# Patient Record
Sex: FEMALE | Race: BLACK OR AFRICAN AMERICAN | NOT HISPANIC OR LATINO | Employment: PART TIME | URBAN - METROPOLITAN AREA
[De-identification: names, ages, dates, MRNs, and addresses within clinical notes are randomized per-mention and may not be internally consistent; named-entity substitution may affect disease eponyms.]

---

## 2020-07-27 ENCOUNTER — TELEPHONE (OUTPATIENT)
Dept: RHEUMATOLOGY | Facility: CLINIC | Age: 60
End: 2020-07-27

## 2020-07-29 ENCOUNTER — TELEPHONE (OUTPATIENT)
Dept: RHEUMATOLOGY | Facility: CLINIC | Age: 60
End: 2020-07-29

## 2020-07-29 NOTE — TELEPHONE ENCOUNTER
----- Message from Desiree Donahue sent at 7/29/2020  9:35 AM CDT -----  Regarding: returning a call back  Contact: pt  Type:  Patient Returning Call    Who Called:pt  Who Left Message for Patient:nurse  Does the patient know what this is regarding?no  Would the patient rather a call back or a response via [email or web address]? Call back  Best Call Back Number:[phone]  Additional Information: none

## 2020-07-29 NOTE — TELEPHONE ENCOUNTER
----- Message from Indira Araiza sent at 7/29/2020  8:28 AM CDT -----  Contact: Ramya Bui miss a call and would like another call back regards to her referral, Please call her at [phone].    Thanks  Td

## 2020-07-31 PROBLEM — E66.01 MORBID OBESITY: Status: ACTIVE | Noted: 2020-07-31

## 2020-08-06 ENCOUNTER — TELEPHONE (OUTPATIENT)
Dept: ORTHOPEDICS | Facility: CLINIC | Age: 60
End: 2020-08-06

## 2020-08-07 ENCOUNTER — TELEPHONE (OUTPATIENT)
Dept: ORTHOPEDICS | Facility: CLINIC | Age: 60
End: 2020-08-07

## 2020-08-10 ENCOUNTER — TELEPHONE (OUTPATIENT)
Dept: ORTHOPEDICS | Facility: CLINIC | Age: 60
End: 2020-08-10

## 2020-08-10 DIAGNOSIS — G89.29 CHRONIC PAIN OF BOTH KNEES: Primary | ICD-10-CM

## 2020-08-10 DIAGNOSIS — M25.562 CHRONIC PAIN OF BOTH KNEES: Primary | ICD-10-CM

## 2020-08-10 DIAGNOSIS — M25.561 CHRONIC PAIN OF BOTH KNEES: Primary | ICD-10-CM

## 2020-08-17 ENCOUNTER — OFFICE VISIT (OUTPATIENT)
Dept: ORTHOPEDICS | Facility: CLINIC | Age: 60
End: 2020-08-17
Payer: MEDICAID

## 2020-08-17 ENCOUNTER — TELEPHONE (OUTPATIENT)
Dept: ORTHOPEDICS | Facility: CLINIC | Age: 60
End: 2020-08-17

## 2020-08-17 ENCOUNTER — HOSPITAL ENCOUNTER (OUTPATIENT)
Dept: RADIOLOGY | Facility: HOSPITAL | Age: 60
Discharge: HOME | End: 2020-08-17
Attending: PHYSICAL MEDICINE & REHABILITATION
Payer: MEDICAID

## 2020-08-17 VITALS
DIASTOLIC BLOOD PRESSURE: 82 MMHG | SYSTOLIC BLOOD PRESSURE: 123 MMHG | HEIGHT: 61 IN | WEIGHT: 269 LBS | HEART RATE: 90 BPM | BODY MASS INDEX: 50.79 KG/M2

## 2020-08-17 DIAGNOSIS — R26.9 GAIT ABNORMALITY: ICD-10-CM

## 2020-08-17 DIAGNOSIS — M17.11 ARTHRITIS OF RIGHT KNEE: Primary | ICD-10-CM

## 2020-08-17 DIAGNOSIS — M23.41 BODIES, LOOSE, JOINT, KNEE, RIGHT: ICD-10-CM

## 2020-08-17 DIAGNOSIS — G89.29 CHRONIC PAIN OF BOTH KNEES: ICD-10-CM

## 2020-08-17 DIAGNOSIS — M25.562 CHRONIC PAIN OF BOTH KNEES: ICD-10-CM

## 2020-08-17 DIAGNOSIS — R52 PAIN: Primary | ICD-10-CM

## 2020-08-17 DIAGNOSIS — M25.561 CHRONIC PAIN OF BOTH KNEES: ICD-10-CM

## 2020-08-17 DIAGNOSIS — S83.241A TEAR OF MEDIAL MENISCUS OF RIGHT KNEE, CURRENT, UNSPECIFIED TEAR TYPE, INITIAL ENCOUNTER: ICD-10-CM

## 2020-08-17 DIAGNOSIS — M65.331 TRIGGER MIDDLE FINGER OF RIGHT HAND: ICD-10-CM

## 2020-08-17 DIAGNOSIS — M25.561 CHRONIC PAIN OF RIGHT KNEE: ICD-10-CM

## 2020-08-17 DIAGNOSIS — G89.29 CHRONIC PAIN OF RIGHT KNEE: ICD-10-CM

## 2020-08-17 PROCEDURE — 73564 XR KNEE ORTHO BILAT WITH FLEXION: ICD-10-PCS | Mod: 26,50,, | Performed by: RADIOLOGY

## 2020-08-17 PROCEDURE — 99215 OFFICE O/P EST HI 40 MIN: CPT | Mod: PBBFAC,25 | Performed by: PHYSICAL MEDICINE & REHABILITATION

## 2020-08-17 PROCEDURE — 99204 OFFICE O/P NEW MOD 45 MIN: CPT | Mod: S$PBB,,, | Performed by: PHYSICAL MEDICINE & REHABILITATION

## 2020-08-17 PROCEDURE — 99204 PR OFFICE/OUTPT VISIT, NEW, LEVL IV, 45-59 MIN: ICD-10-PCS | Mod: S$PBB,,, | Performed by: PHYSICAL MEDICINE & REHABILITATION

## 2020-08-17 PROCEDURE — 73564 X-RAY EXAM KNEE 4 OR MORE: CPT | Mod: TC,50

## 2020-08-17 PROCEDURE — 73564 X-RAY EXAM KNEE 4 OR MORE: CPT | Mod: 26,50,, | Performed by: RADIOLOGY

## 2020-08-17 PROCEDURE — 99999 PR PBB SHADOW E&M-EST. PATIENT-LVL V: CPT | Mod: PBBFAC,,, | Performed by: PHYSICAL MEDICINE & REHABILITATION

## 2020-08-17 PROCEDURE — 99999 PR PBB SHADOW E&M-EST. PATIENT-LVL V: ICD-10-PCS | Mod: PBBFAC,,, | Performed by: PHYSICAL MEDICINE & REHABILITATION

## 2020-08-17 NOTE — LETTER
August 17, 2020      Maggie Bell MD  [address] [facility] [address]           [facility]  [address] [facility] [address]  Phone: [phone]  Fax: [phone]          Patient: Ramya Swain   MR Number: [id number]   YOB: 1960   Date of Visit: 8/17/2020       Dear Dr. Maggie Bell:    Thank you for referring Ramya Swain to me for evaluation. Attached you will find relevant portions of my assessment and plan of care.    If you have questions, please do not hesitate to call me. I look forward to following Ramya Swain along with you.    Sincerely,    Alisha Atkinson MD    Enclosure  CC:  No Recipients    If you would like to receive this communication electronically, please contact [email or web address] or [phone] to request more information on [employer] access.    For providers and/or their staff who would like to refer a patient to [facility], please contact us through our one-stop-shop provider referral line, [facility], at [phone].    If you feel you have received this communication in error or would no longer like to receive these types of communications, please e-mail [email or web address]

## 2020-08-17 NOTE — PATIENT INSTRUCTIONS
Recommend trying Voltaren Gel over the counter, and following the directions on the package.   Remember that you may need to use it consistently for several days before seeing results.        What is Trigger Finger?  Trigger finger is an inflammation of tissue inside your finger or thumb. It is also called tenosynovitis (ten-oh-sin-oh-VY-tis). Tendons (cordlike fibers that attach muscle to bone and allow you to bend the joints) become swollen. So does the synovium (a slick membrane that allows the tendons to move easily). This makes it difficult to straighten the finger or thumb.    Causes  Repeated use of a tool with strong gripping, such as a drill or wrench, can irritate and inflame the tendons and the synovium. It is also more common in certain medical conditions such as rheumatoid arthritis, gout, and diabetes. But often the cause of trigger finger is unknown.  Inside your finger  Tendons connect muscles in your forearm to the bones in your fingers. The tendons in each finger are surrounded by a protective tendon sheath. This sheath is lined with synovium, which produces a fluid that allows the tendons to slide easily when you bend and straighten the finger. If a tendon is irritated, it becomes inflamed.  When a tendon is inflamed  When a tendon is inflamed, it causes the lining of the tendon sheath to swell and thicken. Or the tendon itself may thicken. Then the sheath pinches the tendon, and the tendon can no longer slide easily inside the sheath. When you straighten your finger, the tendon sticks or locks as it tries to squeeze back through the sheath.    Symptoms  The first sign of trigger finger may be pain where the finger or thumb joins the palm. You may also notice some swelling. As the tendon becomes more inflamed, the finger may start to catch when you try to straighten it. When the locked tendon releases, the finger jumps, as if you were releasing the trigger of a gun. This further irritates the  tendon, and may set up a cycle of catching and swelling.   Date Last Reviewed: 9/28/2015  © 1885-1330 [employer]. [address] [place] [address]. All rights reserved. This information is not intended as a substitute for professional medical care. Always follow your healthcare professional's instructions.        Ankle Arthroscopy: Conditions Treated  Arthroscopy is used to find and treat many types of ankle problems. These include loose bodies, bone spurs, osteochondritis dissecans (OCD), and synovitis.    Loose bodies  Loose bodies are bone or cartilage fragments that have chipped off inside the joint. If left in place, they can damage the joint surface and restrict ankle movement. Your surgeon can remove loose bodies from the joint. This will help restore normal, smooth ankle motion.    Bone spurs  When the bones in a joint pinch each other, they are impinged. This problem is often caused by bone spurs (growths) that have formed on the joint. Pressure from the spur may cause pain when you move your ankle. Your surgeon will remove the spur and smooth the bone surface.    OCD  Because of an injury to its blood supply, a piece of bone can become loose inside the joint. It can occur from trauma, or without any obvious cause. Called OCD (osteochondritis dissecans), this problem can cause pain and swelling. The surgeon can remove the bone or secure it in place. Open surgery may also be needed. If the bone piece is removed, your surgeon may try to stimulate healing by drilling or by putting holes into the remaining exposed bone.   Synovitis  If the lining of the joint (synovium) is pinched, it may become inflamed. This can cause pain and swelling in the ankle. The surgeon can remove the pinched synovium and relieve symptoms.  Date Last Reviewed: 8/31/2015  © 9718-9872 [employer]. [address] [place] [address]. All rights reserved. This information is not intended as a  substitute for professional medical care. Always follow your healthcare professional's instructions.

## 2020-08-17 NOTE — PROGRESS NOTES
SPORTS MEDICINE / PM&R NEW PATIENT H & P:    Referring Physician: Maggie Bell MD    Chief Complaint   Patient presents with    Right Knee - Pain, Swelling       HPI: This is a 59 y.o.  female being seen in clinic today for evaluation of Pain and Swelling of the Right Knee   The problem first began several years ago but worsened within the last month. She feels aching, sore, constant, pain at rest and pain with movement pain around her right knee. The symptoms are worsening. She has tried ice, heat, injection, ointment without improvement. She has tried physical therapy without much improvement. Knee steroid injection 3 weeks ago by PCP provided slight relief. PCP wanted to refer her to rheumatology, but apparently they aren't taking medicaid. She also brings up complaints of right long finger trigger finger, and left thigh pain with numbness and tingling that radiates to lower leg. History of back pain as well and has been told she has pinched nerves in her back. Apparently had x-rays several years ago at outside facility.     History obtained from patient.    Past family, medical, social, surgical history, and vital signs reviewed in chart.    Review of Systems   Constitutional: Negative for chills, fever and weight loss.   HENT: Negative for hearing loss and sore throat.    Eyes: Negative for blurred vision, photophobia and pain.   Respiratory: Negative for shortness of breath.    Cardiovascular: Negative for chest pain.   Gastrointestinal: Negative for abdominal pain.   Genitourinary: Negative for dysuria.   Skin: Negative for rash.   Neurological: Negative for tingling and headaches.   Endo/Heme/Allergies: Does not bruise/bleed easily.   Psychiatric/Behavioral: Negative for depression.       General    Nursing note and vitals reviewed.  Constitutional: She is oriented to person, place, and time. She appears well-developed and well-nourished.   obese   HENT:   Head: Normocephalic and atraumatic.   Eyes:  Conjunctivae and EOM are normal. Pupils are equal, round, and reactive to light.   Neck: Neck supple.   Cardiovascular: Intact distal pulses.    Pulmonary/Chest: Effort normal. No respiratory distress.   Abdominal: She exhibits no distension.   Neurological: She is alert and oriented to person, place, and time. She has normal reflexes.   Psychiatric: She has a normal mood and affect.     General Musculoskeletal Exam   Gait: antalgic       Right Knee Exam     Inspection   Erythema: absent  Scars: absent  Swelling: absent  Effusion: absent  Deformity: absent  Bruising: absent    Tenderness   The patient is tender to palpation of the medial joint line, lateral joint line, condyle and patella.    Crepitus   The patient has crepitus of the patella.    Range of Motion   Extension: normal   Flexion:  120 abnormal     Tests   Meniscus   Keren:  Medial - positive Lateral - negative  Ligament Examination Lachman: normal (-1 to 2mm) PCL-Posterior Drawer: normal (0 to 2mm)     MCL - Valgus: normal (0 to 2mm)  LCL - Varus: normal  Patella   Patellar apprehension: negative  Patellar Tracking: normal  Patellar Grind: positive    Other   Popliteal (Baker's) Cyst: present (possible small cyst, but difficult to palpate well given body habitus)  Sensation: normal    Comments:  Significant crepitus    Left Knee Exam     Inspection   Erythema: absent  Swelling: absent  Effusion: absent  Deformity: absent  Bruising: absent    Tenderness   The patient tender to palpation of the medial retinaculum and lateral joint line.    Range of Motion   The patient has normal left knee ROM.    Tests   Meniscus   Keren:  Medial - negative Lateral - negative  Stability Lachman: normal (-1 to 2mm) PCL-Posterior Drawer: normal (0 to 2mm)  MCL - Valgus: normal (0 to 2mm)  LCL - Varus: normal (0 to 2mm)  Patella   Patellar apprehension: negative  Patellar Tracking: normal    Other   Sensation: normal    Right Hip Exam   Right hip exam is normal.      Tests   Pain w/ forced internal rotation (SHIRA): absent  Left Hip Exam   Left hip exam is normal.    Tests   Pain w/ forced internal rotation (SHIRA): absent          Muscle Strength   Right Lower Extremity   Hip Abduction: 4/5   Hip Adduction: 5/5   Hip Flexion: 5/5   Quadriceps:  5/5   Hamstrin/5   Left Lower Extremity   Hip Abduction: 4/5   Hip Adduction: 5/5   Hip Flexion: 5/5   Quadriceps:  5/5   Hamstrin/5     Reflexes     Left Side  Quadriceps:  2+  Achilles:  2+    Right Side   Quadriceps:  2+  Achilles:  2+    Vascular Exam     Right Pulses  Dorsalis Pedis:      2+          Left Pulses  Dorsalis Pedis:      2+                Narrative & Impression     EXAMINATION:  XR KNEE ORTHO BILAT WITH FLEXION     CLINICAL HISTORY:  Pain in right knee     TECHNIQUE:  AP standing of both knees, PA flexion standing views of both knees, and Merchant views of both knees were performed.  Lateral views of both knees were also performed.     COMPARISON:  None     FINDINGS:  Right knee: No acute fractures or dislocations visualized.  There is mild-to-moderate tricompartmental osteoarthritis which appears most severe in the patellofemoral and medial compartments.  Several rounded periarticular ossific densities are present, concerning for intra-articular bodies, most notably posterior to the lateral femoral condyle and within the lateral aspect of the suprapatellar recess.     Left knee:  There is no radiographic evidence of acute osseous, articular, or soft tissue abnormality. There is mild-to-moderate tricompartmental osteoarthritis, most severe in the medial compartment.     Impression:     Degenerative findings as above        Electronically signed by: Abe Farley MD  Date:                                            2020  Time:                                           10:05         IMPRESSION/PLAN: Ramya is a 59 y.o.  female with:    1. Arthritis of right knee  - Ambulatory referral/consult to  Orthopedics    2. Bodies, loose, joint, knee, right    3. Gait abnormality    4. Trigger middle finger of right hand  - Ambulatory referral/consult to Orthopedics; Future    5. Tear of medial meniscus of right knee, current, unspecified tear type, initial encounter    6. Chronic pain of right knee  - MRI Knee Without Contrast Right; Future       The findings were discussed with Ramya in detail. Given likely intrinsic joint pathology and possible intra-articular loose bodies, we'll get MRI for further evaluation. May need knee scope to remove loose bodies. Left leg pain sounds radicular in nature. Will get lumbar x-rays at her follow-up visit. For the right trigger finger, I'll refer to Dr. Guerra for further evaluation and management. There isn't much else for me to do for her knee as she's failed PT, oral meds, and recent steroid injection. She can try voltaren gel and we'll await the x-ray results. All of her questions were answered. She was provided this plan in writing. She will follow-up with me 1 week after MRI.     Alisha Atkinson M.D.  Sports Medicine

## 2020-08-18 ENCOUNTER — TELEPHONE (OUTPATIENT)
Dept: ORTHOPEDICS | Facility: CLINIC | Age: 60
End: 2020-08-18

## 2020-08-18 NOTE — TELEPHONE ENCOUNTER
Returned call. Patient wants to know how much of body would be in MRI tube. Told her I'm not sure, but likely most of it. Recommend she take her xanax 30 minutes prior to appointment and bring someone to drive her. She voiced her understanding.    ----- Message from Preethi Leonard MA sent at 8/18/2020 11:24 AM CDT -----  Regarding: FW: medication  Are you okay with sending medication for a MRI?  Thank you!  ----- Message -----  From: Kristen Cummings  Sent: 8/18/2020  10:59 AM CDT  To: [facility] Brito Staff  Subject: medication                                       Good morning,        Pt would like a call back in regards to medication for MRI and can be reached at [phone].      [id number] [place] - [address]  [address]  [place] [address]  Phone: [phone] Fax: [phone]      Thanks,  Kristen Cummings

## 2020-08-28 ENCOUNTER — TELEPHONE (OUTPATIENT)
Dept: ORTHOPEDICS | Facility: CLINIC | Age: 60
End: 2020-08-28

## 2020-08-28 ENCOUNTER — HOSPITAL ENCOUNTER (OUTPATIENT)
Dept: RADIOLOGY | Facility: HOSPITAL | Age: 60
Discharge: HOME | End: 2020-08-28
Attending: PHYSICAL MEDICINE & REHABILITATION
Payer: MEDICAID

## 2020-08-28 DIAGNOSIS — G89.29 CHRONIC PAIN OF RIGHT KNEE: ICD-10-CM

## 2020-08-28 DIAGNOSIS — M25.561 CHRONIC PAIN OF RIGHT KNEE: ICD-10-CM

## 2020-08-28 DIAGNOSIS — M17.11 ARTHRITIS OF RIGHT KNEE: Primary | ICD-10-CM

## 2020-08-28 DIAGNOSIS — M23.41 BODIES, LOOSE, JOINT, KNEE, RIGHT: ICD-10-CM

## 2020-08-28 PROCEDURE — 73721 MRI JNT OF LWR EXTRE W/O DYE: CPT | Mod: TC,RT

## 2020-08-28 PROCEDURE — 73721 MRI JNT OF LWR EXTRE W/O DYE: CPT | Mod: 26,RT,, | Performed by: RADIOLOGY

## 2020-08-28 PROCEDURE — 73721 MRI KNEE WITHOUT CONTRAST RIGHT: ICD-10-PCS | Mod: 26,RT,, | Performed by: RADIOLOGY

## 2020-09-02 ENCOUNTER — OFFICE VISIT (OUTPATIENT)
Dept: ORTHOPEDICS | Facility: CLINIC | Age: 60
End: 2020-09-02
Payer: MEDICAID

## 2020-09-02 VITALS
WEIGHT: 269 LBS | BODY MASS INDEX: 50.79 KG/M2 | DIASTOLIC BLOOD PRESSURE: 71 MMHG | HEART RATE: 103 BPM | HEIGHT: 61 IN | SYSTOLIC BLOOD PRESSURE: 121 MMHG

## 2020-09-02 DIAGNOSIS — M65.331 TRIGGER MIDDLE FINGER OF RIGHT HAND: Primary | ICD-10-CM

## 2020-09-02 PROCEDURE — 99213 PR OFFICE/OUTPT VISIT, EST, LEVL III, 20-29 MIN: ICD-10-PCS | Mod: S$PBB,25,, | Performed by: ORTHOPAEDIC SURGERY

## 2020-09-02 PROCEDURE — 20550 NJX 1 TENDON SHEATH/LIGAMENT: CPT | Mod: PBBFAC | Performed by: ORTHOPAEDIC SURGERY

## 2020-09-02 PROCEDURE — 99214 OFFICE O/P EST MOD 30 MIN: CPT | Mod: PBBFAC | Performed by: ORTHOPAEDIC SURGERY

## 2020-09-02 PROCEDURE — 99999 PR PBB SHADOW E&M-EST. PATIENT-LVL IV: CPT | Mod: PBBFAC,,, | Performed by: ORTHOPAEDIC SURGERY

## 2020-09-02 PROCEDURE — 20550 TENDON SHEATH: R LONG MCP: ICD-10-PCS | Mod: S$PBB,F7,, | Performed by: ORTHOPAEDIC SURGERY

## 2020-09-02 PROCEDURE — 99999 PR PBB SHADOW E&M-EST. PATIENT-LVL IV: ICD-10-PCS | Mod: PBBFAC,,, | Performed by: ORTHOPAEDIC SURGERY

## 2020-09-02 PROCEDURE — 99213 OFFICE O/P EST LOW 20 MIN: CPT | Mod: S$PBB,25,, | Performed by: ORTHOPAEDIC SURGERY

## 2020-09-02 RX ORDER — TRIAMCINOLONE ACETONIDE 40 MG/ML
40 INJECTION, SUSPENSION INTRA-ARTICULAR; INTRAMUSCULAR
Status: DISCONTINUED | OUTPATIENT
Start: 2020-09-02 | End: 2020-09-02 | Stop reason: HOSPADM

## 2020-09-02 RX ADMIN — TRIAMCINOLONE ACETONIDE 40 MG: 200 INJECTION, SUSPENSION INTRA-ARTICULAR; INTRAMUSCULAR at 02:09

## 2020-09-02 NOTE — LETTER
September 2, 2020      Alisha Atkinson MD  [address]  [place] [address]           [facility]  [address] [place] [address]  Phone: [phone]  Fax: [phone]          Patient: Ramya Swain   MR Number: [id number]   YOB: 1960   Date of Visit: 9/2/2020       Dear Dr. Alisha Atkinson:    Thank you for referring Ramya Swain to me for evaluation. Attached you will find relevant portions of my assessment and plan of care.    If you have questions, please do not hesitate to call me. I look forward to following Ramya Swain along with you.    Sincerely,    Moustapha Guerra MD    Enclosure  CC:  No Recipients    If you would like to receive this communication electronically, please contact [email or web address] or [phone] to request more information on [employer] access.    For providers and/or their staff who would like to refer a patient to [facility], please contact us through our one-stop-shop provider referral line, [facility], at [phone].    If you feel you have received this communication in error or would no longer like to receive these types of communications, please e-mail [email or web address]

## 2020-09-02 NOTE — PROCEDURES
Tendon Sheath: R long MCP    Date/Time: 9/2/2020 2:20 PM  Performed by: Moustapha Guerra MD  Authorized by: Moustapha Guerra MD     Consent Done?:  Yes (Verbal)  Indications:  Pain  Timeout: prior to procedure the correct patient, procedure, and site was verified    Prep: patient was prepped and draped in usual sterile fashion      Local anesthesia used?: Yes    Local anesthetic:  Lidocaine 2% without epinephrine  Anesthetic total (ml):  0.5    Location:  Long finger  Site:  R long MCP  Needle size:  25 G  Approach:  Volar  Medications:  40 mg triamcinolone acetonide 40 mg/mL

## 2020-09-02 NOTE — PROGRESS NOTES
Subjective:     Patient ID: Ramya Swain is a 59 y.o. female.    Chief Complaint: Pain of the Right Hand    The patient is a 59-year-old female with a right long trigger finger for the last 6 years.  She has not had injection before.  She wishes to try cortisone injection today      Past Medical History:   Diagnosis Date    Anxiety     Arthritis     CKD (chronic kidney disease), stage III     Diabetes mellitus, type 2     GERD (gastroesophageal reflux disease)     Hyperlipidemia     Hypertension      Past Surgical History:   Procedure Laterality Date    BILATERAL OOPHORECTOMY      HYSTERECTOMY      NEPHRECTOMY      TUBAL LIGATION       Family History   Problem Relation Age of Onset    Hypertension Mother     Diabetes Mother     Stroke Father      Social History     Socioeconomic History    Marital status: Single     Spouse name: Not on file    Number of children: Not on file    Years of education: Not on file    Highest education level: Not on file   Occupational History    Not on file   Social Needs    Financial resource strain: Not on file    Food insecurity     Worry: Not on file     Inability: Not on file    Transportation needs     Medical: Not on file     Non-medical: Not on file   Tobacco Use    Smoking status: Never Smoker    Smokeless tobacco: Never Used   Substance and Sexual Activity    Alcohol use: Not on file    Drug use: Not on file    Sexual activity: Not on file   Lifestyle    Physical activity     Days per week: Not on file     Minutes per session: Not on file    Stress: Not on file   Relationships    Social connections     Talks on phone: Not on file     Gets together: Not on file     Attends Religion service: Not on file     Active member of club or organization: Not on file     Attends meetings of clubs or organizations: Not on file     Relationship status: Not on file   Other Topics Concern    Not on file   Social History Narrative    Not on file     Medication  "List with Changes/Refills   Current Medications    ACCU-CHEK LUIS PLUS TEST STRP STRP    Test blood glucose BID    ACYCLOVIR (ZOVIRAX) 800 MG TAB    Take 1 tablet (800 mg total) by mouth once daily.    ALBUTEROL (PROVENTIL/VENTOLIN HFA) 90 MCG/ACTUATION INHALER    Inhale 2 puffs into the lungs every 6 (six) hours as needed for Wheezing.    ALPRAZOLAM (XANAX) 1 MG TABLET    TAKE 1 TABLET BY MOUTH EVERY DAY AS NEEDED    AMLODIPINE (NORVASC) 10 MG TABLET    TAKE 1 TABLET BY MOUTH EVERY DAY    ATORVASTATIN (LIPITOR) 40 MG TABLET    Take 1 tablet (40 mg total) by mouth once daily.    BD ULTRA-FINE MINI PEN NEEDLE 31 GAUGE X 3/16" NDLE    USE DAILY WITH VICTOZA INJECTIONS    BLOOD-GLUCOSE METER (ACCU-CHEK LUIS PLUS METER) MISC    Use with test strips for home glucose monitoring daily    CARVEDILOL (COREG) 25 MG TABLET    TAKE 1 TABLET BY MOUTH TWICE A DAY    CLONIDINE (CATAPRES) 0.2 MG TABLET    Take 1 tablet (0.2 mg total) by mouth 2 (two) times daily.    CYCLOBENZAPRINE (FLEXERIL) 10 MG TABLET    TAKE 1 TABLET BY MOUTH THREE TIMES A DAY AS NEEDED FOR MUSCLE SPASMS    FLUTICASONE PROPIONATE (FLONASE) 50 MCG/ACTUATION NASAL SPRAY    2 sprays by Each Nostril route once daily.    FUROSEMIDE (LASIX) 40 MG TABLET    Take 40 mg by mouth as needed.    GABAPENTIN (NEURONTIN) 300 MG CAPSULE    Take 1 capsule (300 mg total) by mouth 3 (three) times daily.    HYDROCHLOROTHIAZIDE (HYDRODIURIL) 25 MG TABLET    Take 1 tablet (25 mg total) by mouth once daily.    HYDROCODONE-ACETAMINOPHEN (NORCO) 5-325 MG PER TABLET    Take 1 tablet by mouth every 6 (six) hours as needed for Pain.    KETOROLAC (TORADOL) 10 MG TABLET    Take 10 mg by mouth every 8 (eight) hours as needed for Pain.    LANCING DEVICE WITH LANCETS (ACCU-CHEK FASTCLIX LANCING DEV) KIT    Test blood glucose BID    LIRAGLUTIDE 0.6 MG/0.1 ML, 18 MG/3 ML, SUBQ PNIJ (VICTOZA 2-JASPER) 0.6 MG/0.1 ML (18 MG/3 ML) PNIJ    INJECT 1.2MG UNDER SKIN DAILY    LOSARTAN (COZAAR) 100 MG " TABLET    Take 1 tablet (100 mg total) by mouth daily as needed.    METFORMIN (GLUCOPHAGE-XR) 500 MG XR 24HR TABLET    Take 1 tablet (500 mg total) by mouth 2 (two) times daily.    TERAZOSIN (HYTRIN) 2 MG CAPSULE    Take 1 capsule (2 mg total) by mouth every evening.     Review of patient's allergies indicates:  No Known Allergies  Review of Systems   Constitution: Negative for malaise/fatigue.   HENT: Negative for hearing loss.    Eyes: Negative for double vision and visual disturbance.   Cardiovascular: Negative for chest pain.   Respiratory: Negative for shortness of breath.    Endocrine: Negative for cold intolerance.   Hematologic/Lymphatic: Does not bruise/bleed easily.   Skin: Negative for poor wound healing and suspicious lesions.   Musculoskeletal: Negative for gout, joint pain and joint swelling.   Gastrointestinal: Positive for heartburn. Negative for nausea and vomiting.   Genitourinary: Positive for dysuria.   Neurological: Negative for numbness, paresthesias and sensory change.   Psychiatric/Behavioral: Negative for depression, memory loss and substance abuse. The patient is nervous/anxious.    Allergic/Immunologic: Negative for persistent infections.       Objective:   Body mass index is 50.83 kg/m².  Vitals:    09/02/20 1422   BP: 121/71   Pulse: 103                General    Constitutional: She is oriented to person, place, and time. She appears well-developed and well-nourished. No distress.   HENT:   Head: Normocephalic.   Eyes: EOM are normal.   Neck: Normal range of motion.   Pulmonary/Chest: Effort normal.   Neurological: She is oriented to person, place, and time.   Psychiatric: She has a normal mood and affect.             Right Hand/Wrist Exam     Inspection   Scars: Wrist - absent Hand -  absent  Effusion: Wrist - absent Hand -  absent    Pain   Hand - The patient exhibits pain of the middle MCP.    Other     Neuorologic Exam    Median Distribution: normal  Ulnar Distribution:  normal  Radial Distribution: normal    Comments:  The patient is active triggering right long finger with a palpable nodule that moves with tendon excursion.          Vascular Exam       Capillary Refill  Right Hand: normal capillary refill      radiographs were not obtained today  Assessment:     Encounter Diagnosis   Name Primary?    Trigger middle finger of right hand         Plan:     The patient injected in the right long trigger finger with 0.5 cc of Kenalog and 0.5 cc of 2% plain lidocaine under sterile technique.  She will return in 3 weeks if not improved                Disclaimer: This note was prepared using a voice recognition system and is likely to have sound alike errors within the text.

## 2020-09-25 ENCOUNTER — HOSPITAL ENCOUNTER (OUTPATIENT)
Dept: CARDIOLOGY | Facility: HOSPITAL | Age: 60
Discharge: HOME | End: 2020-09-25
Attending: ORTHOPAEDIC SURGERY
Payer: MEDICAID

## 2020-09-25 ENCOUNTER — OFFICE VISIT (OUTPATIENT)
Dept: ORTHOPEDICS | Facility: CLINIC | Age: 60
End: 2020-09-25
Payer: MEDICAID

## 2020-09-25 ENCOUNTER — HOSPITAL ENCOUNTER (OUTPATIENT)
Dept: RADIOLOGY | Facility: HOSPITAL | Age: 60
Discharge: HOME | End: 2020-09-25
Attending: ORTHOPAEDIC SURGERY
Payer: MEDICAID

## 2020-09-25 VITALS
BODY MASS INDEX: 50.79 KG/M2 | DIASTOLIC BLOOD PRESSURE: 78 MMHG | SYSTOLIC BLOOD PRESSURE: 122 MMHG | HEART RATE: 91 BPM | WEIGHT: 269 LBS | HEIGHT: 61 IN

## 2020-09-25 DIAGNOSIS — Z01.818 PREOP TESTING: ICD-10-CM

## 2020-09-25 DIAGNOSIS — S83.241A ACUTE MEDIAL MENISCUS TEAR OF RIGHT KNEE, INITIAL ENCOUNTER: Primary | ICD-10-CM

## 2020-09-25 DIAGNOSIS — M17.11 ARTHRITIS OF RIGHT KNEE: ICD-10-CM

## 2020-09-25 DIAGNOSIS — M70.62 GREATER TROCHANTERIC BURSITIS OF LEFT HIP: ICD-10-CM

## 2020-09-25 DIAGNOSIS — G89.29 CHRONIC PAIN OF RIGHT KNEE: ICD-10-CM

## 2020-09-25 DIAGNOSIS — S83.241A ACUTE MEDIAL MENISCUS TEAR OF RIGHT KNEE, INITIAL ENCOUNTER: ICD-10-CM

## 2020-09-25 DIAGNOSIS — M23.41 LOOSE BODY OF RIGHT KNEE: ICD-10-CM

## 2020-09-25 DIAGNOSIS — M94.261 CHONDROMALACIA OF RIGHT KNEE: Primary | ICD-10-CM

## 2020-09-25 DIAGNOSIS — M25.561 CHRONIC PAIN OF RIGHT KNEE: ICD-10-CM

## 2020-09-25 DIAGNOSIS — Z01.818 PREOP TESTING: Primary | ICD-10-CM

## 2020-09-25 DIAGNOSIS — M25.461 EFFUSION OF RIGHT KNEE: ICD-10-CM

## 2020-09-25 PROCEDURE — 93005 ELECTROCARDIOGRAM TRACING: CPT

## 2020-09-25 PROCEDURE — 71046 X-RAY EXAM CHEST 2 VIEWS: CPT | Mod: TC

## 2020-09-25 PROCEDURE — 99215 OFFICE O/P EST HI 40 MIN: CPT | Mod: PBBFAC,25 | Performed by: ORTHOPAEDIC SURGERY

## 2020-09-25 PROCEDURE — 93010 ELECTROCARDIOGRAM REPORT: CPT | Mod: ,,, | Performed by: INTERNAL MEDICINE

## 2020-09-25 PROCEDURE — 99214 OFFICE O/P EST MOD 30 MIN: CPT | Mod: S$PBB,,, | Performed by: ORTHOPAEDIC SURGERY

## 2020-09-25 PROCEDURE — 71046 X-RAY EXAM CHEST 2 VIEWS: CPT | Mod: 26,,, | Performed by: RADIOLOGY

## 2020-09-25 PROCEDURE — 99999 PR PBB SHADOW E&M-EST. PATIENT-LVL V: CPT | Mod: PBBFAC,,, | Performed by: ORTHOPAEDIC SURGERY

## 2020-09-25 PROCEDURE — 99999 PR PBB SHADOW E&M-EST. PATIENT-LVL V: ICD-10-PCS | Mod: PBBFAC,,, | Performed by: ORTHOPAEDIC SURGERY

## 2020-09-25 PROCEDURE — 71046 XR CHEST PA AND LATERAL PRE-OP: ICD-10-PCS | Mod: 26,,, | Performed by: RADIOLOGY

## 2020-09-25 PROCEDURE — 93010 EKG 12-LEAD: ICD-10-PCS | Mod: ,,, | Performed by: INTERNAL MEDICINE

## 2020-09-25 PROCEDURE — 99214 PR OFFICE/OUTPT VISIT, EST, LEVL IV, 30-39 MIN: ICD-10-PCS | Mod: S$PBB,,, | Performed by: ORTHOPAEDIC SURGERY

## 2020-09-25 NOTE — H&P (VIEW-ONLY)
Subjective:     Patient ID: Ramya Swain is a 60 y.o. female.    Chief Complaint: Pain of the Right Knee   09/25/2020   left hip pain  HPI:  60-year-old been having pain for 2-3 years now.  Cannot remember any specific trauma.  She occasionally does have back pain.  Treatment included physical therapy at [facility].  Cannot take NSAIDs due to having 1 kidney and being diabetic.  Claims cannot sleep on the left side a bothers her.  She does have occasional low back pain.  She does take Tylenol but does not help.  Her workup included x-rays and MRI of her right knee.  Pain is 7/10.  She claims she has catching locking feeling unable to kneel unable to do stairs as well.  She feels occasionally about to fall catches herself because of the right knee.  Pain is episodic.  She is ambulating without any assistive devices.  Denies any fever or chills or shortness of breath or difficulty with chewing or swallowing loss of bowel bladder control or blurry vision double vision or loss of sense of smell or taste  Past Medical History:   Diagnosis Date    Anxiety     Arthritis     CKD (chronic kidney disease), stage III     Diabetes mellitus, type 2     GERD (gastroesophageal reflux disease)     Hyperlipidemia     Hypertension      Past Surgical History:   Procedure Laterality Date    BILATERAL OOPHORECTOMY      HYSTERECTOMY      NEPHRECTOMY      TUBAL LIGATION       Family History   Problem Relation Age of Onset    Hypertension Mother     Diabetes Mother     Stroke Father      Social History     Socioeconomic History    Marital status: Single     Spouse name: Not on file    Number of children: Not on file    Years of education: Not on file    Highest education level: Not on file   Occupational History    Not on file   Social Needs    Financial resource strain: Not on file    Food insecurity     Worry: Not on file     Inability: Not on file    Transportation needs     Medical: Not on file      "Non-medical: Not on file   Tobacco Use    Smoking status: Never Smoker    Smokeless tobacco: Never Used   Substance and Sexual Activity    Alcohol use: Not on file    Drug use: Not on file    Sexual activity: Not on file   Lifestyle    Physical activity     Days per week: Not on file     Minutes per session: Not on file    Stress: Not on file   Relationships    Social connections     Talks on phone: Not on file     Gets together: Not on file     Attends Jain service: Not on file     Active member of club or organization: Not on file     Attends meetings of clubs or organizations: Not on file     Relationship status: Not on file   Other Topics Concern    Not on file   Social History Narrative    Not on file     Medication List with Changes/Refills   Current Medications    ACCU-CHEK LUIS PLUS TEST STRP STRP    Test blood glucose BID    ACYCLOVIR (ZOVIRAX) 800 MG TAB    Take 1 tablet (800 mg total) by mouth once daily.    ALBUTEROL (PROVENTIL/VENTOLIN HFA) 90 MCG/ACTUATION INHALER    Inhale 2 puffs into the lungs every 6 (six) hours as needed for Wheezing.    ALPRAZOLAM (XANAX) 1 MG TABLET    TAKE 1 TABLET BY MOUTH EVERY DAY AS NEEDED    AMLODIPINE (NORVASC) 10 MG TABLET    TAKE 1 TABLET BY MOUTH EVERY DAY    ATORVASTATIN (LIPITOR) 40 MG TABLET    Take 1 tablet (40 mg total) by mouth once daily.    BD ULTRA-FINE MINI PEN NEEDLE 31 GAUGE X 3/16" NDLE    USE DAILY WITH VICTOZA INJECTIONS    BLOOD-GLUCOSE METER (ACCU-CHEK LUIS PLUS METER) MISC    Use with test strips for home glucose monitoring daily    CARVEDILOL (COREG) 25 MG TABLET    TAKE 1 TABLET BY MOUTH TWICE A DAY    CLONIDINE (CATAPRES) 0.2 MG TABLET    Take 1 tablet (0.2 mg total) by mouth 2 (two) times daily.    CYCLOBENZAPRINE (FLEXERIL) 10 MG TABLET    TAKE 1 TABLET BY MOUTH THREE TIMES A DAY AS NEEDED FOR MUSCLE SPASMS    FLUTICASONE PROPIONATE (FLONASE) 50 MCG/ACTUATION NASAL SPRAY    2 sprays by Each Nostril route once daily.    FUROSEMIDE " (LASIX) 40 MG TABLET    Take 40 mg by mouth as needed.    GABAPENTIN (NEURONTIN) 300 MG CAPSULE    Take 1 capsule (300 mg total) by mouth 3 (three) times daily.    HYDROCHLOROTHIAZIDE (HYDRODIURIL) 25 MG TABLET    Take 1 tablet (25 mg total) by mouth once daily.    HYDROCODONE-ACETAMINOPHEN (NORCO) 5-325 MG PER TABLET    Take 1 tablet by mouth every 6 (six) hours as needed for Pain.    KETOROLAC (TORADOL) 10 MG TABLET    Take 10 mg by mouth every 8 (eight) hours as needed for Pain.    LANCING DEVICE WITH LANCETS (ACCU-CHEK FASTCLIX LANCING DEV) KIT    Test blood glucose BID    LIRAGLUTIDE 0.6 MG/0.1 ML, 18 MG/3 ML, SUBQ PNIJ (VICTOZA 2-JASPER) 0.6 MG/0.1 ML (18 MG/3 ML) PNIJ    INJECT 1.2MG UNDER SKIN DAILY    LOSARTAN (COZAAR) 100 MG TABLET    Take 1 tablet (100 mg total) by mouth daily as needed.    METFORMIN (GLUCOPHAGE-XR) 500 MG ER 24HR TABLET    TAKE 1 TABLET BY MOUTH TWICE A DAY    TERAZOSIN (HYTRIN) 2 MG CAPSULE    Take 1 capsule (2 mg total) by mouth every evening.     Review of patient's allergies indicates:  No Known Allergies  Review of Systems   Constitution: Negative for decreased appetite.   HENT: Negative for tinnitus.    Eyes: Negative for double vision.   Cardiovascular: Negative for chest pain.   Respiratory: Negative for wheezing.    Hematologic/Lymphatic: Negative for bleeding problem.   Skin: Negative for dry skin.   Musculoskeletal: Positive for back pain, joint pain and stiffness. Negative for arthritis, gout and neck pain.   Gastrointestinal: Negative for abdominal pain.   Genitourinary: Negative for bladder incontinence.   Neurological: Negative for numbness, paresthesias and sensory change.   Psychiatric/Behavioral: Negative for altered mental status.       Objective:   Body mass index is 50.83 kg/m².  Vitals:    09/25/20 0906   BP: 122/78   Pulse: 91          General    Constitutional: She is oriented to person, place, and time. She appears well-developed.   HENT:   Head: Atraumatic.   Eyes:  EOM are normal.   Cardiovascular: Normal rate.    Pulmonary/Chest: Effort normal.   Neurological: She is alert and oriented to person, place, and time.   Psychiatric: Judgment normal.            Cervical rotation is very functional  Bilateral upper extremity neurovascularly intact.  Negative Tinel.  Strength is 5/5.  Radial and ulnar pulses 2+.  Lumbar with slight discomfort around L4-L5 paraspinal but no true deformity seen.  Negative straight leg raising bilaterally  Left hip with severe pain to palpation over the greater trochanter.  Passive internal external rotation of both hips without pain in the groin.  Hip flexors, abductors, adductors, quads, hamstrings, ankle extensors and flexors, inverters and everters all 5/5  Right knee with medial joint pain there is also crepitus and discomfort in the feeling of her loose body in the superior lateral pouch to palpation.  Range of motion 0-100 degrees.  Collaterals are stable as well as cruciates.  Mild to moderate swelling.  Positive Keren sign.  Left knee with very mild discomfort medially.  Mild crepitus.  Full range of motion.  Collaterals or cruciates are stable.  No swelling.  Calves are soft nontender with mild swelling  Multiple varicosities  Ankle motion intact  DP 1+ and PT 1+  Skin is warm to touch no obvious lesions    Relevant imaging results reviewed and interpreted by me, discussed with the patient and / or family today     X-Ray Chest PA And Lateral Pre-OP  Narrative: EXAMINATION:  XR CHEST PA AND LATERAL PRE-OP    CLINICAL HISTORY:  Encounter for other preprocedural examination    TECHNIQUE:  PA and lateral views of the chest were performed.    COMPARISON:  None    FINDINGS:  Cardiac silhouette and mediastinal contours are normal.  Lungs are clear.  Osseous structures are intact.  Impression: No acute cardiopulmonary process.    Electronically signed by: Benson Lee MD  Date:    09/25/2020  Time:    10:26    X-ray in the electronic records  of both of her knees 08/17/2020 showing some degenerative changes but joint space is fairly maintained bilaterally.  There is some osteophytic changes and some sclerosis  MRI 08/28/2020 with medial meniscus tear, loose bodies, osteophytic changes and some mild chondral thinning  Assessment:     Encounter Diagnoses   Name Primary?    Chondromalacia of right knee Yes    Acute medial meniscus tear of right knee, initial encounter     Loose body of right knee     Effusion of right knee     Arthritis of right knee     Chronic pain of right knee     Greater trochanteric bursitis of left hip         Plan:   Chondromalacia of right knee    Acute medial meniscus tear of right knee, initial encounter    Loose body of right knee    Effusion of right knee    Arthritis of right knee  -     Ambulatory referral/consult to Orthopedics    Chronic pain of right knee  -     Ambulatory referral/consult to Orthopedics    Greater trochanteric bursitis of left hip         Patient Instructions   Right knee medial meniscus tear/cartilage with a piece of cartilage floating around inside the knee and arthritis  You cannot take anti-inflammatories due to having 1 kidney in being diabetic like Motrin Advil Aleve knee  Continue with the Tylenol 1000 mg maximum twice a day  Since x-ray looks that the joint space is very well maintained I recommend arthroscopic surgery to the right knee  Arthroscopic surgery is done under general anesthesia.  We make 2 or 3 little incisions and put the camera inside the knee and we can look inside the knee and take care whatever is floating around we can remove and trim the cartilage that stone out.  Surgeries roughly 15-20 minutes.  It is outpatient surgery go home right afterwards.  It will take roughly 6-8 weeks to heal.  He will be allowed to walk on it immediately even though you limp.  We will give you a prescription for Norco afterwards to help you initially with the pain.  You will keep the dressing  on for 3-4 days in can get the incisions wet.  After 3 4 days may get in the shower let the water run.  You not allowed to soak in the tub for at least 2 weeks.  Will see you in 2 weeks take the stitches out  There is slight risk of nerve damage blood vessel damage infection blood clot fat clot failure of the procedure to achieve isn't intended purpose.  Arthroscopic surgery does not do much for arthritis  Will schedule right knee arthroscopy and left greater trochanteric injection          Disclaimer: This note was prepared using a voice recognition system and is likely to have sound alike errors within the text.

## 2020-09-25 NOTE — PROGRESS NOTES
Subjective:     Patient ID: Ramya Swain is a 60 y.o. female.    Chief Complaint: Pain of the Right Knee   09/25/2020   left hip pain  HPI:  60-year-old been having pain for 2-3 years now.  Cannot remember any specific trauma.  She occasionally does have back pain.  Treatment included physical therapy at [facility].  Cannot take NSAIDs due to having 1 kidney and being diabetic.  Claims cannot sleep on the left side a bothers her.  She does have occasional low back pain.  She does take Tylenol but does not help.  Her workup included x-rays and MRI of her right knee.  Pain is 7/10.  She claims she has catching locking feeling unable to kneel unable to do stairs as well.  She feels occasionally about to fall catches herself because of the right knee.  Pain is episodic.  She is ambulating without any assistive devices.  Denies any fever or chills or shortness of breath or difficulty with chewing or swallowing loss of bowel bladder control or blurry vision double vision or loss of sense of smell or taste  Past Medical History:   Diagnosis Date    Anxiety     Arthritis     CKD (chronic kidney disease), stage III     Diabetes mellitus, type 2     GERD (gastroesophageal reflux disease)     Hyperlipidemia     Hypertension      Past Surgical History:   Procedure Laterality Date    BILATERAL OOPHORECTOMY      HYSTERECTOMY      NEPHRECTOMY      TUBAL LIGATION       Family History   Problem Relation Age of Onset    Hypertension Mother     Diabetes Mother     Stroke Father      Social History     Socioeconomic History    Marital status: Single     Spouse name: Not on file    Number of children: Not on file    Years of education: Not on file    Highest education level: Not on file   Occupational History    Not on file   Social Needs    Financial resource strain: Not on file    Food insecurity     Worry: Not on file     Inability: Not on file    Transportation needs     Medical: Not on file      "Non-medical: Not on file   Tobacco Use    Smoking status: Never Smoker    Smokeless tobacco: Never Used   Substance and Sexual Activity    Alcohol use: Not on file    Drug use: Not on file    Sexual activity: Not on file   Lifestyle    Physical activity     Days per week: Not on file     Minutes per session: Not on file    Stress: Not on file   Relationships    Social connections     Talks on phone: Not on file     Gets together: Not on file     Attends Latter day service: Not on file     Active member of club or organization: Not on file     Attends meetings of clubs or organizations: Not on file     Relationship status: Not on file   Other Topics Concern    Not on file   Social History Narrative    Not on file     Medication List with Changes/Refills   Current Medications    ACCU-CHEK LUIS PLUS TEST STRP STRP    Test blood glucose BID    ACYCLOVIR (ZOVIRAX) 800 MG TAB    Take 1 tablet (800 mg total) by mouth once daily.    ALBUTEROL (PROVENTIL/VENTOLIN HFA) 90 MCG/ACTUATION INHALER    Inhale 2 puffs into the lungs every 6 (six) hours as needed for Wheezing.    ALPRAZOLAM (XANAX) 1 MG TABLET    TAKE 1 TABLET BY MOUTH EVERY DAY AS NEEDED    AMLODIPINE (NORVASC) 10 MG TABLET    TAKE 1 TABLET BY MOUTH EVERY DAY    ATORVASTATIN (LIPITOR) 40 MG TABLET    Take 1 tablet (40 mg total) by mouth once daily.    BD ULTRA-FINE MINI PEN NEEDLE 31 GAUGE X 3/16" NDLE    USE DAILY WITH VICTOZA INJECTIONS    BLOOD-GLUCOSE METER (ACCU-CHEK LUIS PLUS METER) MISC    Use with test strips for home glucose monitoring daily    CARVEDILOL (COREG) 25 MG TABLET    TAKE 1 TABLET BY MOUTH TWICE A DAY    CLONIDINE (CATAPRES) 0.2 MG TABLET    Take 1 tablet (0.2 mg total) by mouth 2 (two) times daily.    CYCLOBENZAPRINE (FLEXERIL) 10 MG TABLET    TAKE 1 TABLET BY MOUTH THREE TIMES A DAY AS NEEDED FOR MUSCLE SPASMS    FLUTICASONE PROPIONATE (FLONASE) 50 MCG/ACTUATION NASAL SPRAY    2 sprays by Each Nostril route once daily.    FUROSEMIDE " (LASIX) 40 MG TABLET    Take 40 mg by mouth as needed.    GABAPENTIN (NEURONTIN) 300 MG CAPSULE    Take 1 capsule (300 mg total) by mouth 3 (three) times daily.    HYDROCHLOROTHIAZIDE (HYDRODIURIL) 25 MG TABLET    Take 1 tablet (25 mg total) by mouth once daily.    HYDROCODONE-ACETAMINOPHEN (NORCO) 5-325 MG PER TABLET    Take 1 tablet by mouth every 6 (six) hours as needed for Pain.    KETOROLAC (TORADOL) 10 MG TABLET    Take 10 mg by mouth every 8 (eight) hours as needed for Pain.    LANCING DEVICE WITH LANCETS (ACCU-CHEK FASTCLIX LANCING DEV) KIT    Test blood glucose BID    LIRAGLUTIDE 0.6 MG/0.1 ML, 18 MG/3 ML, SUBQ PNIJ (VICTOZA 2-JASPER) 0.6 MG/0.1 ML (18 MG/3 ML) PNIJ    INJECT 1.2MG UNDER SKIN DAILY    LOSARTAN (COZAAR) 100 MG TABLET    Take 1 tablet (100 mg total) by mouth daily as needed.    METFORMIN (GLUCOPHAGE-XR) 500 MG ER 24HR TABLET    TAKE 1 TABLET BY MOUTH TWICE A DAY    TERAZOSIN (HYTRIN) 2 MG CAPSULE    Take 1 capsule (2 mg total) by mouth every evening.     Review of patient's allergies indicates:  No Known Allergies  Review of Systems   Constitution: Negative for decreased appetite.   HENT: Negative for tinnitus.    Eyes: Negative for double vision.   Cardiovascular: Negative for chest pain.   Respiratory: Negative for wheezing.    Hematologic/Lymphatic: Negative for bleeding problem.   Skin: Negative for dry skin.   Musculoskeletal: Positive for back pain, joint pain and stiffness. Negative for arthritis, gout and neck pain.   Gastrointestinal: Negative for abdominal pain.   Genitourinary: Negative for bladder incontinence.   Neurological: Negative for numbness, paresthesias and sensory change.   Psychiatric/Behavioral: Negative for altered mental status.       Objective:   Body mass index is 50.83 kg/m².  Vitals:    09/25/20 0906   BP: 122/78   Pulse: 91          General    Constitutional: She is oriented to person, place, and time. She appears well-developed.   HENT:   Head: Atraumatic.   Eyes:  EOM are normal.   Cardiovascular: Normal rate.    Pulmonary/Chest: Effort normal.   Neurological: She is alert and oriented to person, place, and time.   Psychiatric: Judgment normal.            Cervical rotation is very functional  Bilateral upper extremity neurovascularly intact.  Negative Tinel.  Strength is 5/5.  Radial and ulnar pulses 2+.  Lumbar with slight discomfort around L4-L5 paraspinal but no true deformity seen.  Negative straight leg raising bilaterally  Left hip with severe pain to palpation over the greater trochanter.  Passive internal external rotation of both hips without pain in the groin.  Hip flexors, abductors, adductors, quads, hamstrings, ankle extensors and flexors, inverters and everters all 5/5  Right knee with medial joint pain there is also crepitus and discomfort in the feeling of her loose body in the superior lateral pouch to palpation.  Range of motion 0-100 degrees.  Collaterals are stable as well as cruciates.  Mild to moderate swelling.  Positive Keren sign.  Left knee with very mild discomfort medially.  Mild crepitus.  Full range of motion.  Collaterals or cruciates are stable.  No swelling.  Calves are soft nontender with mild swelling  Multiple varicosities  Ankle motion intact  DP 1+ and PT 1+  Skin is warm to touch no obvious lesions    Relevant imaging results reviewed and interpreted by me, discussed with the patient and / or family today     X-Ray Chest PA And Lateral Pre-OP  Narrative: EXAMINATION:  XR CHEST PA AND LATERAL PRE-OP    CLINICAL HISTORY:  Encounter for other preprocedural examination    TECHNIQUE:  PA and lateral views of the chest were performed.    COMPARISON:  None    FINDINGS:  Cardiac silhouette and mediastinal contours are normal.  Lungs are clear.  Osseous structures are intact.  Impression: No acute cardiopulmonary process.    Electronically signed by: Benson Lee MD  Date:    09/25/2020  Time:    10:26    X-ray in the electronic records  of both of her knees 08/17/2020 showing some degenerative changes but joint space is fairly maintained bilaterally.  There is some osteophytic changes and some sclerosis  MRI 08/28/2020 with medial meniscus tear, loose bodies, osteophytic changes and some mild chondral thinning  Assessment:     Encounter Diagnoses   Name Primary?    Chondromalacia of right knee Yes    Acute medial meniscus tear of right knee, initial encounter     Loose body of right knee     Effusion of right knee     Arthritis of right knee     Chronic pain of right knee     Greater trochanteric bursitis of left hip         Plan:   Chondromalacia of right knee    Acute medial meniscus tear of right knee, initial encounter    Loose body of right knee    Effusion of right knee    Arthritis of right knee  -     Ambulatory referral/consult to Orthopedics    Chronic pain of right knee  -     Ambulatory referral/consult to Orthopedics    Greater trochanteric bursitis of left hip         Patient Instructions   Right knee medial meniscus tear/cartilage with a piece of cartilage floating around inside the knee and arthritis  You cannot take anti-inflammatories due to having 1 kidney in being diabetic like Motrin Advil Aleve knee  Continue with the Tylenol 1000 mg maximum twice a day  Since x-ray looks that the joint space is very well maintained I recommend arthroscopic surgery to the right knee  Arthroscopic surgery is done under general anesthesia.  We make 2 or 3 little incisions and put the camera inside the knee and we can look inside the knee and take care whatever is floating around we can remove and trim the cartilage that stone out.  Surgeries roughly 15-20 minutes.  It is outpatient surgery go home right afterwards.  It will take roughly 6-8 weeks to heal.  He will be allowed to walk on it immediately even though you limp.  We will give you a prescription for Norco afterwards to help you initially with the pain.  You will keep the dressing  on for 3-4 days in can get the incisions wet.  After 3 4 days may get in the shower let the water run.  You not allowed to soak in the tub for at least 2 weeks.  Will see you in 2 weeks take the stitches out  There is slight risk of nerve damage blood vessel damage infection blood clot fat clot failure of the procedure to achieve isn't intended purpose.  Arthroscopic surgery does not do much for arthritis  Will schedule right knee arthroscopy and left greater trochanteric injection          Disclaimer: This note was prepared using a voice recognition system and is likely to have sound alike errors within the text.

## 2020-09-25 NOTE — PATIENT INSTRUCTIONS
Right knee medial meniscus tear/cartilage with a piece of cartilage floating around inside the knee and arthritis  You cannot take anti-inflammatories due to having 1 kidney in being diabetic like Motrin Advil Aleve knee  Continue with the Tylenol 1000 mg maximum twice a day  Since x-ray looks that the joint space is very well maintained I recommend arthroscopic surgery to the right knee  Arthroscopic surgery is done under general anesthesia.  We make 2 or 3 little incisions and put the camera inside the knee and we can look inside the knee and take care whatever is floating around we can remove and trim the cartilage that stone out.  Surgeries roughly 15-20 minutes.  It is outpatient surgery go home right afterwards.  It will take roughly 6-8 weeks to heal.  He will be allowed to walk on it immediately even though you limp.  We will give you a prescription for Norco afterwards to help you initially with the pain.  You will keep the dressing on for 3-4 days in can get the incisions wet.  After 3 4 days may get in the shower let the water run.  You not allowed to soak in the tub for at least 2 weeks.  Will see you in 2 weeks take the stitches out  There is slight risk of nerve damage blood vessel damage infection blood clot fat clot failure of the procedure to achieve isn't intended purpose.  Arthroscopic surgery does not do much for arthritis  Will schedule right knee arthroscopy and left greater trochanteric injection

## 2020-09-25 NOTE — PRE ADMISSION SCREENING
Pre op instructions reviewed with patient per phone:    To confirm, Your surgeon has instructed you:  Surgery is scheduled 10/02/20at 0800.      Please report to [facility] floor main lobby by 0630.   Pre admit office to call afternoon prior to surgery with final arrival time    Covid 19 testing is scheduled for 09/29/20 at 0920  @ ON  Please self quarantine after Covid testing, prior to surgery      INSTRUCTIONS IMPORTANT!!!  ¨ Do not eat, drink, or smoke after 12 midnight prior to surgery, including water. OK to brush teeth, no gum, candy or mints!    ¨ Take only these medicines with a small swallow of water-morning of surgery.  Acyclovir, Amlodipine, Carvedilol, Clonidine, Gabapentin        ____   Do not have underarms/legs 3 days prior to surgery  ____   Due to COVID 19 concerns, 1 visitor will be allowed in the pre operative area, and must adhere to social distancing guidelines.  One visitor/family member is currently allowed to visit in-patient rooms from 08:00 a.m - 6:00 p.m    ____   Family/caregivers will be updated re pt status via text/cell phone      ____  Do not wear makeup, including mascara.  ____  No powder, lotions or creams to surgical area.  ____  Please remove all jewelry, including piercings and leave at home.  ____  No money or valuables needed. Please leave at home.  ____  Please bring identification and insurance information to hospital.  ____  If going home the same day, arrange for a ride home. You will not be able to   drive if Anesthesia was used.  ____  Children, under 12 years old, must remain in the waiting room with an adult.  They are not allowed in patient areas.  ____  Wear loose fitting clothing. Allow for dressings, bandages.  ____  Stop Aspirin, Ibuprofen, Motrin and Aleve at least 5-7 days before surgery, unless otherwise instructed by your doctor, or the nurse.   You MAY use Tylenol/acetaminophen until day of surgery.  ____  If you take diabetic  medication, do not take am of surgery unless instructed by   Doctor.  ____ Stop taking any Fish Oil supplement or any Vitamins that contain Vitamin E at least 5 days prior to surgery.          Bathing Instructions-- The night before surgery and the morning prior to coming to the hospital:   -Do not shave the surgical area.   -Shower and wash your hair and body as usual with anti-bacterial  soap and shampoo.   -Rinse your hair and body completely.   -Use one packet of hibiclens to wash the surgical site (using your hand) gently for 5 minutes.  Do not scrub you skin too hard.   -Do not use hibiclens on your head, face, or genitals.   -Do not wash with anti-bacterial soap after you use the hibiclens.   -Rinse your body thoroughly.   -Dry with clean, soft towel.  Do not use lotion, cream, deodorant, or powders on   the surgical site.    Use antibacterial soap in place of hibiclens if your surgery is on the head, face or genitals.         Surgical Site Infection    Prevention of surgical site infections:     -Keep incisions clean and dry.   -Do not soak/submerge incisions in water until completely healed.   -Do not apply lotions, powders, creams, or deodorants to site.   -Always make sure hands are cleaned with antibacterial soap/ alcohol-based   prior to touching the surgical site.  (This includes doctors, nurses, staff, and yourself.)    Signs and symptoms:   -Redness and pain around the area where you had surgery   -Drainage of cloudy fluid from your surgical wound   -Fever over 100.4  I have read or had read and explained to me, and understand the above information.

## 2020-09-25 NOTE — LETTER
September 25, 2020      Alisha Atkinson MD  [address]  [place] [address]           '[facility]  [address] [place] [id number]  Phone: [phone]  Fax: [phone]          Patient: Ramya Swain   MR Number: [id number]   YOB: 1960   Date of Visit: 9/25/2020       Dear Dr. Alisha Atkinson:    Thank you for referring Ramya Swain to me for evaluation. Attached you will find relevant portions of my assessment and plan of care.    If you have questions, please do not hesitate to call me. I look forward to following Ramya Swain along with you.    Sincerely,    Braxton Tierney MD    Enclosure  CC:  No Recipients    If you would like to receive this communication electronically, please contact [email or web address] or [phone] to request more information on [employer] access.    For providers and/or their staff who would like to refer a patient to [facility], please contact us through our one-stop-shop provider referral line, [facility] , at [phone].    If you feel you have received this communication in error or would no longer like to receive these types of communications, please e-mail [email or web address]

## 2020-09-29 ENCOUNTER — LAB VISIT (OUTPATIENT)
Dept: OTOLARYNGOLOGY | Facility: CLINIC | Age: 60
End: 2020-09-29
Payer: MEDICAID

## 2020-09-29 DIAGNOSIS — Z01.818 PREOP TESTING: ICD-10-CM

## 2020-09-29 PROCEDURE — U0003 INFECTIOUS AGENT DETECTION BY NUCLEIC ACID (DNA OR RNA); SEVERE ACUTE RESPIRATORY SYNDROME CORONAVIRUS 2 (SARS-COV-2) (CORONAVIRUS DISEASE [COVID-19]), AMPLIFIED PROBE TECHNIQUE, MAKING USE OF HIGH THROUGHPUT TECHNOLOGIES AS DESCRIBED BY CMS-2020-01-R: HCPCS

## 2020-09-30 LAB — SARS-COV-2 RNA RESP QL NAA+PROBE: NOT DETECTED

## 2020-10-01 NOTE — TELEPHONE ENCOUNTER
Phone has busy signal.    Refill request received from [facility] via fax for Sertraline and DOK. Refills sent until patient's appt in Nov.

## 2020-10-02 ENCOUNTER — ANESTHESIA (OUTPATIENT)
Dept: SURGERY | Facility: HOSPITAL | Age: 60
End: 2020-10-02
Payer: MEDICAID

## 2020-10-02 ENCOUNTER — HOSPITAL ENCOUNTER (OUTPATIENT)
Facility: HOSPITAL | Age: 60
Discharge: HOME | End: 2020-10-02
Attending: ORTHOPAEDIC SURGERY | Admitting: ORTHOPAEDIC SURGERY
Payer: MEDICAID

## 2020-10-02 ENCOUNTER — ANESTHESIA EVENT (OUTPATIENT)
Dept: SURGERY | Facility: HOSPITAL | Age: 60
End: 2020-10-02
Payer: MEDICAID

## 2020-10-02 DIAGNOSIS — S83.241D ACUTE MEDIAL MENISCUS TEAR OF RIGHT KNEE, SUBSEQUENT ENCOUNTER: Primary | ICD-10-CM

## 2020-10-02 PROBLEM — S83.241A ACUTE MEDIAL MENISCUS TEAR OF RIGHT KNEE: Status: ACTIVE | Noted: 2020-10-02

## 2020-10-02 LAB
POCT GLUCOSE: 152 MG/DL (ref 70–110)
POCT GLUCOSE: 185 MG/DL (ref 70–110)

## 2020-10-02 PROCEDURE — 36000710: Performed by: ORTHOPAEDIC SURGERY

## 2020-10-02 PROCEDURE — 20610 DRAIN/INJ JOINT/BURSA W/O US: CPT | Mod: 59,51,LT, | Performed by: ORTHOPAEDIC SURGERY

## 2020-10-02 PROCEDURE — 71000015 HC POSTOP RECOV 1ST HR: Performed by: ORTHOPAEDIC SURGERY

## 2020-10-02 PROCEDURE — 36000711: Performed by: ORTHOPAEDIC SURGERY

## 2020-10-02 PROCEDURE — 01400 ANES OPN/ARTHRS KNEE JT NOS: CPT | Performed by: ORTHOPAEDIC SURGERY

## 2020-10-02 PROCEDURE — 71000033 HC RECOVERY, INTIAL HOUR: Performed by: ORTHOPAEDIC SURGERY

## 2020-10-02 PROCEDURE — 37000008 HC ANESTHESIA 1ST 15 MINUTES: Performed by: ORTHOPAEDIC SURGERY

## 2020-10-02 PROCEDURE — 29881 PR KNEE SCOPE SINGLE MENISECECTOMY: ICD-10-PCS | Mod: RT,,, | Performed by: ORTHOPAEDIC SURGERY

## 2020-10-02 PROCEDURE — 27201423 OPTIME MED/SURG SUP & DEVICES STERILE SUPPLY: Performed by: ORTHOPAEDIC SURGERY

## 2020-10-02 PROCEDURE — 63600175 PHARM REV CODE 636 W HCPCS: Performed by: ORTHOPAEDIC SURGERY

## 2020-10-02 PROCEDURE — 25000003 PHARM REV CODE 250: Performed by: NURSE ANESTHETIST, CERTIFIED REGISTERED

## 2020-10-02 PROCEDURE — 25000003 PHARM REV CODE 250: Performed by: ORTHOPAEDIC SURGERY

## 2020-10-02 PROCEDURE — 20610 PR DRAIN/INJECT LARGE JOINT/BURSA: ICD-10-PCS | Mod: 59,51,LT, | Performed by: ORTHOPAEDIC SURGERY

## 2020-10-02 PROCEDURE — 63600175 PHARM REV CODE 636 W HCPCS: Performed by: NURSE ANESTHETIST, CERTIFIED REGISTERED

## 2020-10-02 PROCEDURE — 25000242 PHARM REV CODE 250 ALT 637 W/ HCPCS: Performed by: NURSE ANESTHETIST, CERTIFIED REGISTERED

## 2020-10-02 PROCEDURE — 29881 ARTHRS KNE SRG MNISECTMY M/L: CPT | Mod: RT,,, | Performed by: ORTHOPAEDIC SURGERY

## 2020-10-02 PROCEDURE — 37000009 HC ANESTHESIA EA ADD 15 MINS: Performed by: ORTHOPAEDIC SURGERY

## 2020-10-02 RX ORDER — ACETAMINOPHEN 10 MG/ML
INJECTION, SOLUTION INTRAVENOUS
Status: DISCONTINUED | OUTPATIENT
Start: 2020-10-02 | End: 2020-10-02

## 2020-10-02 RX ORDER — BUPIVACAINE HYDROCHLORIDE 2.5 MG/ML
INJECTION, SOLUTION EPIDURAL; INFILTRATION; INTRACAUDAL
Status: DISCONTINUED | OUTPATIENT
Start: 2020-10-02 | End: 2020-10-02 | Stop reason: HOSPADM

## 2020-10-02 RX ORDER — ONDANSETRON 2 MG/ML
4 INJECTION INTRAMUSCULAR; INTRAVENOUS EVERY 12 HOURS PRN
Status: CANCELLED | OUTPATIENT
Start: 2020-10-02

## 2020-10-02 RX ORDER — FENTANYL CITRATE 50 UG/ML
INJECTION, SOLUTION INTRAMUSCULAR; INTRAVENOUS
Status: DISCONTINUED | OUTPATIENT
Start: 2020-10-02 | End: 2020-10-02

## 2020-10-02 RX ORDER — LIDOCAINE HYDROCHLORIDE 10 MG/ML
INJECTION, SOLUTION EPIDURAL; INFILTRATION; INTRACAUDAL; PERINEURAL
Status: DISCONTINUED | OUTPATIENT
Start: 2020-10-02 | End: 2020-10-02

## 2020-10-02 RX ORDER — CHLORHEXIDINE GLUCONATE ORAL RINSE 1.2 MG/ML
10 SOLUTION DENTAL
Status: DISCONTINUED | OUTPATIENT
Start: 2020-10-02 | End: 2020-10-02 | Stop reason: HOSPADM

## 2020-10-02 RX ORDER — CEFAZOLIN SODIUM 2 G/50ML
2 SOLUTION INTRAVENOUS
Status: COMPLETED | OUTPATIENT
Start: 2020-10-02 | End: 2020-10-02

## 2020-10-02 RX ORDER — ONDANSETRON 2 MG/ML
4 INJECTION INTRAMUSCULAR; INTRAVENOUS DAILY PRN
Status: DISCONTINUED | OUTPATIENT
Start: 2020-10-02 | End: 2020-10-02 | Stop reason: HOSPADM

## 2020-10-02 RX ORDER — ALBUTEROL SULFATE 90 UG/1
AEROSOL, METERED RESPIRATORY (INHALATION)
Status: DISCONTINUED | OUTPATIENT
Start: 2020-10-02 | End: 2020-10-02

## 2020-10-02 RX ORDER — HYDROCODONE BITARTRATE AND ACETAMINOPHEN 5; 325 MG/1; MG/1
1 TABLET ORAL EVERY 4 HOURS PRN
Status: DISCONTINUED | OUTPATIENT
Start: 2020-10-02 | End: 2020-10-02 | Stop reason: HOSPADM

## 2020-10-02 RX ORDER — PHENYLEPHRINE HYDROCHLORIDE 10 MG/ML
INJECTION INTRAVENOUS
Status: DISCONTINUED | OUTPATIENT
Start: 2020-10-02 | End: 2020-10-02

## 2020-10-02 RX ORDER — ONDANSETRON 2 MG/ML
INJECTION INTRAMUSCULAR; INTRAVENOUS
Status: DISCONTINUED | OUTPATIENT
Start: 2020-10-02 | End: 2020-10-02

## 2020-10-02 RX ORDER — HYDROCODONE BITARTRATE AND ACETAMINOPHEN 5; 325 MG/1; MG/1
1 TABLET ORAL EVERY 8 HOURS PRN
Qty: 21 TABLET | Refills: 0 | Status: SHIPPED | OUTPATIENT
Start: 2020-10-02 | End: 2020-11-24

## 2020-10-02 RX ORDER — SODIUM CHLORIDE, SODIUM LACTATE, POTASSIUM CHLORIDE, CALCIUM CHLORIDE 600; 310; 30; 20 MG/100ML; MG/100ML; MG/100ML; MG/100ML
INJECTION, SOLUTION INTRAVENOUS CONTINUOUS PRN
Status: DISCONTINUED | OUTPATIENT
Start: 2020-10-02 | End: 2020-10-02

## 2020-10-02 RX ORDER — PROPOFOL 10 MG/ML
VIAL (ML) INTRAVENOUS
Status: DISCONTINUED | OUTPATIENT
Start: 2020-10-02 | End: 2020-10-02

## 2020-10-02 RX ORDER — SUCCINYLCHOLINE CHLORIDE 20 MG/ML
INJECTION INTRAMUSCULAR; INTRAVENOUS
Status: DISCONTINUED | OUTPATIENT
Start: 2020-10-02 | End: 2020-10-02

## 2020-10-02 RX ORDER — SODIUM CHLORIDE 9 MG/ML
INJECTION, SOLUTION INTRAVENOUS CONTINUOUS
Status: DISCONTINUED | OUTPATIENT
Start: 2020-10-02 | End: 2020-10-02 | Stop reason: HOSPADM

## 2020-10-02 RX ORDER — LIDOCAINE HYDROCHLORIDE 10 MG/ML
INJECTION, SOLUTION EPIDURAL; INFILTRATION; INTRACAUDAL; PERINEURAL
Status: DISCONTINUED | OUTPATIENT
Start: 2020-10-02 | End: 2020-10-02 | Stop reason: HOSPADM

## 2020-10-02 RX ORDER — METHYLPREDNISOLONE ACETATE 40 MG/ML
INJECTION, SUSPENSION INTRA-ARTICULAR; INTRALESIONAL; INTRAMUSCULAR; SOFT TISSUE
Status: DISCONTINUED | OUTPATIENT
Start: 2020-10-02 | End: 2020-10-02 | Stop reason: HOSPADM

## 2020-10-02 RX ORDER — HYDROMORPHONE HYDROCHLORIDE 2 MG/ML
0.2 INJECTION, SOLUTION INTRAMUSCULAR; INTRAVENOUS; SUBCUTANEOUS EVERY 5 MIN PRN
Status: DISCONTINUED | OUTPATIENT
Start: 2020-10-02 | End: 2020-10-02 | Stop reason: HOSPADM

## 2020-10-02 RX ADMIN — ONDANSETRON 4 MG: 2 INJECTION, SOLUTION INTRAMUSCULAR; INTRAVENOUS at 08:10

## 2020-10-02 RX ADMIN — PHENYLEPHRINE HYDROCHLORIDE 100 MCG: 10 INJECTION INTRAVENOUS at 08:10

## 2020-10-02 RX ADMIN — HYDROCODONE BITARTRATE AND ACETAMINOPHEN 1 TABLET: 5; 325 TABLET ORAL at 09:10

## 2020-10-02 RX ADMIN — SODIUM CHLORIDE, SODIUM LACTATE, POTASSIUM CHLORIDE, AND CALCIUM CHLORIDE: 600; 310; 30; 20 INJECTION, SOLUTION INTRAVENOUS at 07:10

## 2020-10-02 RX ADMIN — CEFAZOLIN SODIUM 3 G: 2 SOLUTION INTRAVENOUS at 08:10

## 2020-10-02 RX ADMIN — LIDOCAINE HYDROCHLORIDE 100 MG: 10 INJECTION, SOLUTION EPIDURAL; INFILTRATION; INTRACAUDAL; PERINEURAL at 08:10

## 2020-10-02 RX ADMIN — FENTANYL CITRATE 25 MCG: 50 INJECTION, SOLUTION INTRAMUSCULAR; INTRAVENOUS at 07:10

## 2020-10-02 RX ADMIN — SUCCINYLCHOLINE CHLORIDE 200 MG: 20 INJECTION, SOLUTION INTRAMUSCULAR; INTRAVENOUS at 08:10

## 2020-10-02 RX ADMIN — FENTANYL CITRATE 75 MCG: 50 INJECTION, SOLUTION INTRAMUSCULAR; INTRAVENOUS at 08:10

## 2020-10-02 RX ADMIN — ACETAMINOPHEN 1000 MG: 10 INJECTION, SOLUTION INTRAVENOUS at 08:10

## 2020-10-02 RX ADMIN — PROPOFOL 200 MG: 10 INJECTION, EMULSION INTRAVENOUS at 08:10

## 2020-10-02 RX ADMIN — PROPOFOL 50 MG: 10 INJECTION, EMULSION INTRAVENOUS at 08:10

## 2020-10-02 RX ADMIN — ALBUTEROL SULFATE 5 PUFF: 90 AEROSOL, METERED RESPIRATORY (INHALATION) at 08:10

## 2020-10-02 NOTE — ANESTHESIA PREPROCEDURE EVALUATION
"                                                                                                             10/02/2020  Ramya Swain is a 60 y.o., female.    Anesthesia Evaluation    I have reviewed the Patient Summary Reports.    I have reviewed the Nursing Notes. I have reviewed the NPO Status.   I have reviewed the Medications.     Review of Systems  Anesthesia Hx:  No problems with previous Anesthesia Denies Hx of Anesthetic complications  Denies Family Hx of Anesthesia complications.   Denies Personal Hx of Anesthesia complications.   Social:  Non-Smoker, No Alcohol Use    Cardiovascular:   Hypertension ECG has been reviewed.    Pulmonary:  Pulmonary Normal    Renal/:   Chronic Renal Disease, CRI    Hepatic/GI:   GERD    Neurological:  Neurology Normal    Endocrine:   Diabetes, type 2    Psych:  Psychiatric Normal         Patient Active Problem List   Diagnosis    Hypertension    Hyperlipidemia    GERD (gastroesophageal reflux disease)    Diabetes mellitus, type 2    CKD (chronic kidney disease), stage III    Arthritis    Anxiety    Morbid obesity    Acute medial meniscus tear of right knee     No current facility-administered medications on file prior to encounter.      Current Outpatient Medications on File Prior to Encounter   Medication Sig Dispense Refill    acyclovir (ZOVIRAX) 800 MG Tab Take 1 tablet (800 mg total) by mouth once daily. 30 tablet 5    ALPRAZolam (XANAX) 1 MG tablet TAKE 1 TABLET BY MOUTH EVERY DAY AS NEEDED (Patient taking differently: Take 1 mg by mouth daily as needed. TAKE 1 TABLET BY MOUTH EVERY DAY AS NEEDED) 30 tablet 1    amLODIPine (NORVASC) 10 MG tablet TAKE 1 TABLET BY MOUTH EVERY DAY (Patient taking differently: Take 10 mg by mouth once daily. ) 30 tablet 1    atorvastatin (LIPITOR) 40 MG tablet Take 1 tablet (40 mg total) by mouth once daily. 30 tablet 6    BD ULTRA-FINE MINI PEN NEEDLE 31 gauge x 3/16" Ndle USE DAILY WITH VICTOZA INJECTIONS 100 each 3 "    blood-glucose meter (ACCU-CHEK LUIS PLUS METER) Misc Use with test strips for home glucose monitoring daily 1 each 0    carvediloL (COREG) 25 MG tablet TAKE 1 TABLET BY MOUTH TWICE A DAY 60 tablet 2    cloNIDine (CATAPRES) 0.2 MG tablet Take 1 tablet (0.2 mg total) by mouth 2 (two) times daily. 60 tablet 3    cyclobenzaprine (FLEXERIL) 10 MG tablet TAKE 1 TABLET BY MOUTH THREE TIMES A DAY AS NEEDED FOR MUSCLE SPASMS (Patient taking differently: Take 10 mg by mouth 3 (three) times daily as needed. TAKE 1 TABLET BY MOUTH THREE TIMES A DAY AS NEEDED FOR MUSCLE SPASMS) 20 tablet 2    fluticasone propionate (FLONASE) 50 mcg/actuation nasal spray 2 sprays by Each Nostril route daily as needed.   0    gabapentin (NEURONTIN) 300 MG capsule Take 1 capsule (300 mg total) by mouth 3 (three) times daily. 90 capsule 2    hydroCHLOROthiazide (HYDRODIURIL) 25 MG tablet Take 1 tablet (25 mg total) by mouth once daily. 30 tablet 5    lancing device with lancets (ACCU-CHEK FASTCLIX LANCING DEV) Kit Test blood glucose  each 3    liraglutide 0.6 mg/0.1 mL, 18 mg/3 mL, subq PNIJ (VICTOZA 2-JASPER) 0.6 mg/0.1 mL (18 mg/3 mL) PnIj INJECT 1.2MG UNDER SKIN DAILY 6 Syringe 3    losartan (COZAAR) 100 MG tablet Take 1 tablet (100 mg total) by mouth daily as needed. 30 tablet 5    metFORMIN (GLUCOPHAGE-XR) 500 MG ER 24hr tablet TAKE 1 TABLET BY MOUTH TWICE A DAY (Patient taking differently: Take 500 mg by mouth 2 (two) times daily with meals. ) 60 tablet 3    terazosin (HYTRIN) 2 MG capsule Take 1 capsule (2 mg total) by mouth every evening. 30 capsule 3    ACCU-CHEK LUIS PLUS TEST STRP Strp Test blood glucose  strip 3    albuterol (PROVENTIL/VENTOLIN HFA) 90 mcg/actuation inhaler Inhale 2 puffs into the lungs every 6 (six) hours as needed for Wheezing. 18 g 0    furosemide (LASIX) 40 MG tablet Take 40 mg by mouth as needed.  2     Past Surgical History:   Procedure Laterality Date    BILATERAL OOPHORECTOMY       "HYSTERECTOMY      TUBAL LIGATION             Physical Exam  General:  Well nourished, Morbid Obesity    Airway/Jaw/Neck:  Airway Findings: Mouth Opening: Normal Tongue: Normal  General Airway Assessment: Adult  Mallampati: II  TM Distance: 4 - 6 cm  Jaw/Neck Findings:  Neck ROM: Normal ROM      Dental:  Dental Findings:Multiple missing teeth,poor dentition, pt states that she was told that she needed to get her teeth "pulled"   Chest/Lungs:  Chest/Lungs Findings: Clear to auscultation, Normal Respiratory Rate     Heart/Vascular:  Heart Findings: Rate: Normal  Rhythm: Regular Rhythm  Sounds: Normal        Mental Status:  Mental Status Findings:  Cooperative, Alert and Oriented       Lab Results   Component Value Date    WBC 9.58 09/25/2020    HGB 12.4 09/25/2020    HCT 42.8 09/25/2020    MCV 96 09/25/2020     09/25/2020         Chemistry        Component Value Date/Time     09/25/2020 1047    K 4.8 09/25/2020 1047    CL 99 09/25/2020 1047    CO2 33 (H) 09/25/2020 1047    BUN 32 (H) 09/25/2020 1047    CREATININE 1.4 09/25/2020 1047     09/25/2020 1047        Component Value Date/Time    CALCIUM 9.8 09/25/2020 1047    ALKPHOS 112 09/25/2020 1047    AST 40 09/25/2020 1047    ALT 51 (H) 09/25/2020 1047    BILITOT 0.4 09/25/2020 1047    ESTGFRAFRICA 47.4 (A) 09/25/2020 1047    EGFRNONAA 41.2 (A) 09/25/2020 1047            Anesthesia Plan  Type of Anesthesia, risks & benefits discussed:  Anesthesia Type:  general  Patient's Preference:   Intra-op Monitoring Plan: standard ASA monitors  Intra-op Monitoring Plan Comments:   Post Op Pain Control Plan: per primary service following discharge from PACU  Post Op Pain Control Plan Comments:   Induction:   IV  Beta Blocker:  Patient is on a Beta-Blocker and has received one dose within the past 24 hours (No further documentation required).       Informed Consent: Patient understands risks and agrees with Anesthesia plan.  Questions answered. Anesthesia consent " signed with patient.  ASA Score: 3     Day of Surgery Review of History & Physical: I have interviewed and examined the patient. I have reviewed the patient's H&P dated:  There are no significant changes.  H&P update referred to the surgeon.         Ready For Surgery From Anesthesia Perspective.

## 2020-10-02 NOTE — DISCHARGE SUMMARY
[facility]  Discharge Note  Short Stay    Procedure(s) (LRB):  ARTHROSCOPY, KNEE (Right)  INJECTION, JOINT, SHOULDER, HIP, OR KNEE (Left)  MENISCECTOMY, KNEE, MEDIAL (Right)  REMOVAL (Right)     OUTCOME: Patient tolerated treatment/procedure well without complication and is now ready for discharge.    DISPOSITION: Home or Self Care    FINAL DIAGNOSIS:  <principal problem not specified>  Left hip trochanteric bursitis, right knee medial meniscus tear, chondromalacia type 2 and 3 tricompartmental, loose body  FOLLOWUP: In orthopedic clinic

## 2020-10-02 NOTE — OP NOTE
[facility] -   Orthopedic Surgery  Operative Note    SUMMARY     Date of Procedure: 10/2/2020     Procedure: Procedure(s) (LRB):  ARTHROSCOPY, KNEE (Right)  INJECTION, JOINT, SHOULDER, HIP, OR KNEE (Left)  MENISCECTOMY, KNEE, MEDIAL (Right)     Right knee arthroscopy, partial medial meniscectomy, removal loose body x1 and partial synovectomy  Injection left hip trochanteric bursa  Surgeon(s) and Role:     * Braxton Tierney MD - Primary    Assisting Surgeon: None    Pre-Operative Diagnosis: Acute medial meniscus tear of right knee, initial encounter [S83.241A]  Greater trochanteric bursitis of left hip [M70.62]    Post-Operative Diagnosis: Post-Op Diagnosis Codes:     * Acute medial meniscus tear of right knee, initial encounter [S83.241A]     * Greater trochanteric bursitis of left hip [M70.62]  Right knee loose body, chondromalacia anterior and medial compartment type 3 and lateral compartment type 2  Anesthesia: General    Significant Surgical Tasks Conducted by the Assistant(s), if Applicable:  No assistant    Complications: none     Estimated Blood Loss (EBL):  10 mL           Implants: None    Specimens: None           Condition: Good    Disposition: PACU - hemodynamically stable.    Attestation: I performed the procedure.  Tourniquet time 18 minutes  Injected 15 cc 0.25% Marcaine  Description:   Procedure after administering general anesthesia patient left hip palpated for the greater trochanter and using 18 gauge spinal needle we went over the greater troch from the lateral side and injected 8 cc of 1% lidocaine mixed with 80 mg Depo-Medrol.  Needle removed Band-Aid applied  Proceeded with prepping and draping the right knee in the usual sterile fashion.  Esmarch was used and a thigh tourniquet inflated 300 mmHg.  Anterolateral portal identified small stab incision made blunt trocar used to enter the joint.  Has chondromalacia type 3 of the anterior compartment there was a loose body in the  superior pouch we entered the medial gutter inspected it and then entered the medial joint.  Has root avulsion tear in the posterior medial corner.  There was hypertrophic synovium anteriorly that was impinging between the femoral condyle and the anterior tibia.  Identified the anteromedial portal with spinal needle.  Blunt trocar used to enter the joint after a stab incision was made.  Proceeded with the shaver to do anterior synovectomy.  Biters to do partial posteromedial meniscectomy and then the shaver to balance it.  We entered the notch ACL and PCL with normal enter the lateral compartment has chondromalacia type 2 and 3 of the weight-bearing surface but the meniscus appeared to be intact.  In inspected the lateral gutter had a very small loose body that was shaved off.  Reinspected all of the knee all compartments and both gutters did a small resection of some of the synovial man teary early.  Drained the knee joint closed the 2 portals using 3 O nylon injected Marcaine sterile dressing applied, tourniquet deflated at 18 minutes

## 2020-10-02 NOTE — ANESTHESIA POSTPROCEDURE EVALUATION
Anesthesia Post Evaluation    Patient: Ramya Swain    Procedure(s) Performed: Procedure(s) (LRB):  ARTHROSCOPY, KNEE (Right)  INJECTION, JOINT, SHOULDER, HIP, OR KNEE (Left)  MENISCECTOMY, KNEE, MEDIAL (Right)  REMOVAL (Right)    Final Anesthesia Type: general    Patient location during evaluation: PACU  Patient participation: Yes- Able to Participate  Level of consciousness: awake and alert  Post-procedure vital signs: reviewed and stable  Pain management: adequate  Airway patency: patent  NORMAN mitigation strategies: Extubation while patient is awake  PONV status at discharge: No PONV  Anesthetic complications: no      Cardiovascular status: hemodynamically stable  Respiratory status: spontaneous ventilation  Hydration status: euvolemic  Follow-up not needed.          Vitals Value Taken Time   /62 10/02/20 0940   Temp 36 °C (96.8 °F) 10/02/20 0940   Pulse 72 10/02/20 0940   Resp 21 10/02/20 0940   SpO2 91 % 10/02/20 0940   Vitals shown include unvalidated device data.      Event Time   Out of Recovery 09:41:38         Pain/Vanessa Score: Pain Rating Prior to Med Admin: 4 (10/2/2020  9:28 AM)  Vanessa Score: 10 (10/2/2020  9:45 AM)

## 2020-10-02 NOTE — TRANSFER OF CARE
"Anesthesia Transfer of Care Note    Patient: Ramya Swain    Procedure(s) Performed: Procedure(s) (LRB):  ARTHROSCOPY, KNEE (Right)  INJECTION, JOINT, SHOULDER, HIP, OR KNEE (Left)  MENISCECTOMY, KNEE, MEDIAL (Right)  REMOVAL (Right)    Patient location: PACU    Anesthesia Type: general    Transport from OR: Transported from OR on 6-10 L/min O2 by face mask with adequate spontaneous ventilation    Post pain: adequate analgesia    Post assessment: no apparent anesthetic complications    Post vital signs: stable    Level of consciousness: awake    Nausea/Vomiting: no nausea/vomiting    Complications: none    Transfer of care protocol was followed      Last vitals:   Visit Vitals  /78 (BP Location: Right arm, Patient Position: Sitting)   Pulse 72   Temp 36 °C (96.8 °F) (Temporal)   Resp 10   Ht 5' 1" (1.549 m)   Wt 119.1 kg (262 lb 9.1 oz)   SpO2 98%   Breastfeeding No   BMI 49.61 kg/m²     "

## 2020-10-06 VITALS
BODY MASS INDEX: 49.57 KG/M2 | SYSTOLIC BLOOD PRESSURE: 102 MMHG | OXYGEN SATURATION: 95 % | HEIGHT: 61 IN | TEMPERATURE: 97 F | HEART RATE: 72 BPM | DIASTOLIC BLOOD PRESSURE: 62 MMHG | RESPIRATION RATE: 21 BRPM | WEIGHT: 262.56 LBS

## 2020-10-16 ENCOUNTER — OFFICE VISIT (OUTPATIENT)
Dept: ORTHOPEDICS | Facility: CLINIC | Age: 60
End: 2020-10-16
Payer: MEDICAID

## 2020-10-16 VITALS
HEART RATE: 88 BPM | WEIGHT: 262 LBS | DIASTOLIC BLOOD PRESSURE: 86 MMHG | SYSTOLIC BLOOD PRESSURE: 134 MMHG | TEMPERATURE: 98 F | HEIGHT: 61 IN | BODY MASS INDEX: 49.47 KG/M2

## 2020-10-16 DIAGNOSIS — M23.41 LOOSE BODY OF RIGHT KNEE: ICD-10-CM

## 2020-10-16 DIAGNOSIS — M94.261 CHONDROMALACIA OF RIGHT KNEE: ICD-10-CM

## 2020-10-16 DIAGNOSIS — M70.62 GREATER TROCHANTERIC BURSITIS OF LEFT HIP: ICD-10-CM

## 2020-10-16 DIAGNOSIS — S83.241A ACUTE MEDIAL MENISCUS TEAR OF RIGHT KNEE, INITIAL ENCOUNTER: Primary | ICD-10-CM

## 2020-10-16 DIAGNOSIS — G89.18 POST-OP PAIN: ICD-10-CM

## 2020-10-16 PROCEDURE — 99024 POSTOP FOLLOW-UP VISIT: CPT | Mod: ,,, | Performed by: PHYSICIAN ASSISTANT

## 2020-10-16 PROCEDURE — 99999 PR PBB SHADOW E&M-EST. PATIENT-LVL IV: ICD-10-PCS | Mod: PBBFAC,,, | Performed by: PHYSICIAN ASSISTANT

## 2020-10-16 PROCEDURE — 99024 PR POST-OP FOLLOW-UP VISIT: ICD-10-PCS | Mod: ,,, | Performed by: PHYSICIAN ASSISTANT

## 2020-10-16 PROCEDURE — 99214 OFFICE O/P EST MOD 30 MIN: CPT | Mod: PBBFAC | Performed by: PHYSICIAN ASSISTANT

## 2020-10-16 PROCEDURE — 99999 PR PBB SHADOW E&M-EST. PATIENT-LVL IV: CPT | Mod: PBBFAC,,, | Performed by: PHYSICIAN ASSISTANT

## 2020-10-16 NOTE — PROGRESS NOTES
Patient ID: Ramya Swain is a 60 y.o. female.    Chief Complaint: Post-op Evaluation of the Left Hip and Post-op Evaluation of the Right Knee      HPI: Ramya Swain  is a 60 y.o. female who c/o Post-op Evaluation of the Left Hip and Post-op Evaluation of the Right Knee   for duration of 2 weeks.  She had history of right knee scope with left-sided trochanteric bursa injection with Dr. Tierney.  She comes in today for routine postoperative care.  Pain level is 3/10.  Quality is aching intermittent.  She feels better than she did prior to surgery.  She takes pain medication if needed.  She still has some.  Pain is worsened nighttime.  Hip pain is also improved.    Surgery:  Right knee arthroscopy with partial medial meniscectomy and loose body excision                  Left trochanteric bursa injection  Date of surgery 10/02/2020    Past Medical History:   Diagnosis Date    Anxiety     Arthritis     CKD (chronic kidney disease), stage III     Congenital single kidney     Diabetes mellitus, type 2     GERD (gastroesophageal reflux disease)     Hyperlipidemia     Hypertension      Past Surgical History:   Procedure Laterality Date    ARTHROSCOPY OF KNEE Right 10/2/2020    Procedure: ARTHROSCOPY, KNEE;  Surgeon: Braxton Tierney MD;  Location: [facility] OR;  Service: Orthopedics;  Laterality: Right;  left greater trochanteric injection    BILATERAL OOPHORECTOMY      EXCISION OF MEDIAL MENISCUS OF KNEE Right 10/2/2020    Procedure: MENISCECTOMY, KNEE, MEDIAL;  Surgeon: Braxton Tierney MD;  Location: [facility] OR;  Service: Orthopedics;  Laterality: Right;  Partial medial meniscectomy    HYSTERECTOMY      INJECTION OF JOINT Left 10/2/2020    Procedure: INJECTION, JOINT, SHOULDER, HIP, OR KNEE;  Surgeon: Braxton Tierney MD;  Location: [facility] OR;  Service: Orthopedics;  Laterality: Left;    SYNOVECTOMY Right 10/2/2020    Procedure: SYNOVECTOMY;  Surgeon: Braxton Tierney MD;  Location: [facility]  OR;  Service: Orthopedics;  Laterality: Right;    TUBAL LIGATION       Family History   Problem Relation Age of Onset    Hypertension Mother     Diabetes Mother     Stroke Father      Social History     Socioeconomic History    Marital status: Single     Spouse name: Not on file    Number of children: Not on file    Years of education: Not on file    Highest education level: Not on file   Occupational History    Not on file   Social Needs    Financial resource strain: Not on file    Food insecurity     Worry: Not on file     Inability: Not on file    Transportation needs     Medical: Not on file     Non-medical: Not on file   Tobacco Use    Smoking status: Never Smoker    Smokeless tobacco: Never Used   Substance and Sexual Activity    Alcohol use: Never     Frequency: Never    Drug use: Never    Sexual activity: Not on file   Lifestyle    Physical activity     Days per week: Not on file     Minutes per session: Not on file    Stress: Not on file   Relationships    Social connections     Talks on phone: Not on file     Gets together: Not on file     Attends [employer] service: Not on file     Active member of club or organization: Not on file     Attends meetings of clubs or organizations: Not on file     Relationship status: Not on file   Other Topics Concern    Not on file   Social History Narrative    Not on file     Medication List with Changes/Refills   Current Medications    ACCU-CHEK LUIS PLUS TEST STRP STRP    Test blood glucose BID    ACYCLOVIR (ZOVIRAX) 800 MG TAB    Take 1 tablet (800 mg total) by mouth once daily.    ALBUTEROL (PROVENTIL/VENTOLIN HFA) 90 MCG/ACTUATION INHALER    Inhale 2 puffs into the lungs every 6 (six) hours as needed for Wheezing.    ALPRAZOLAM (XANAX) 1 MG TABLET    Take 1 tablet (1 mg total) by mouth daily as needed.    AMLODIPINE (NORVASC) 10 MG TABLET    TAKE 1 TABLET BY MOUTH EVERY DAY    ATORVASTATIN (LIPITOR) 40 MG TABLET    Take 1 tablet (40 mg total) by  "mouth once daily.    BD ULTRA-FINE MINI PEN NEEDLE 31 GAUGE X 3/16" NDLE    USE DAILY WITH VICTOZA INJECTIONS    BLOOD-GLUCOSE METER (ACCU-CHEK LUIS PLUS METER) MISC    Use with test strips for home glucose monitoring daily    CARVEDILOL (COREG) 25 MG TABLET    TAKE 1 TABLET BY MOUTH TWICE A DAY    CLONIDINE (CATAPRES) 0.2 MG TABLET    TAKE 1 TABLET (0.2 MG TOTAL) BY MOUTH 2 (TWO) TIMES DAILY.    CYCLOBENZAPRINE (FLEXERIL) 10 MG TABLET    TAKE 1 TABLET BY MOUTH THREE TIMES A DAY AS NEEDED FOR MUSCLE SPASMS    FLUTICASONE PROPIONATE (FLONASE) 50 MCG/ACTUATION NASAL SPRAY    2 sprays by Each Nostril route daily as needed.     FUROSEMIDE (LASIX) 40 MG TABLET    Take 40 mg by mouth as needed.    GABAPENTIN (NEURONTIN) 300 MG CAPSULE    Take 1 capsule (300 mg total) by mouth 3 (three) times daily.    HYDROCHLOROTHIAZIDE (HYDRODIURIL) 25 MG TABLET    Take 1 tablet (25 mg total) by mouth once daily.    HYDROCODONE-ACETAMINOPHEN (NORCO) 5-325 MG PER TABLET    Take 1 tablet by mouth every 8 (eight) hours as needed for Pain.    LANCING DEVICE WITH LANCETS (ACCU-CHEK FASTCLIX LANCING DEV) KIT    Test blood glucose BID    LIRAGLUTIDE 0.6 MG/0.1 ML, 18 MG/3 ML, SUBQ PNIJ (VICTOZA 2-JASPER) 0.6 MG/0.1 ML (18 MG/3 ML) PNIJ    INJECT 1.2MG UNDER SKIN DAILY    LOSARTAN (COZAAR) 100 MG TABLET    Take 1 tablet (100 mg total) by mouth daily as needed.    METFORMIN (GLUCOPHAGE-XR) 500 MG ER 24HR TABLET    TAKE 1 TABLET BY MOUTH TWICE A DAY    POLYETHYLENE GLYCOL (GLYCOLAX) 17 GRAM/DOSE POWDER    30 cc po BID. Mix with liquid    TERAZOSIN (HYTRIN) 2 MG CAPSULE    Take 1 capsule (2 mg total) by mouth every evening.     Review of patient's allergies indicates:  No Known Allergies    Objective:                  Right Knee Exam     Comments:  Extensor mechanism intact  ROM 0-120  Incision C/D/I  No SOI  Calf NT   No edema  Comp soft  senssation intact    Left Knee Exam     Comments:          Assessment:       No diagnosis found.       Plan:    "    There are no diagnoses linked to this encounter.    Ramya Swain is an established pt who comes in today for follow-up on the above.  She may resume activities as tolerated.  I have clean the incisions with peroxide and remove sutures.  Suture strips applied across the incision.  She will follow up with Dr. Tierney as scheduled in approximately 3-4 weeks.  If she has any problems before then, she will notify the office.  She will keep an eye out for symptoms of infection including erythema and purulent drainage.  She will notify the office of any concerns.  She verbalized understanding and agreed.    No follow-ups on file.    The patient understands, chooses and consents to this plan and accepts all   the risks which include but are not limited to the risks mentioned above.     Disclaimer: This note was prepared using a voice recognition system and is likely to have sound alike errors within the text.

## 2020-10-23 ENCOUNTER — TELEPHONE (OUTPATIENT)
Dept: ORTHOPEDICS | Facility: CLINIC | Age: 60
End: 2020-10-23

## 2020-10-23 NOTE — TELEPHONE ENCOUNTER
Spoke to the patient an was able to move her to the 9:40 appointment. Patient verbalized understanding.       ----- Message from Kemi Blackman sent at 10/23/2020  8:59 AM CDT -----  Regarding: post op appt time  Type:  Needs Medical Advice    Who Called: pt  Symptoms (please be specific): n/a   How long has patient had these symptoms:  n/a  Pharmacy name and phone #:  n/a  Would the patient rather a call back or a response via [facility]? Call back  Best Call Back Number: [phone] or [phone]  Additional Information: Pt catches transportation and is requesting a 9;40 appt. Please call back.Thanks

## 2020-11-09 ENCOUNTER — OFFICE VISIT (OUTPATIENT)
Dept: ORTHOPEDICS | Facility: CLINIC | Age: 60
End: 2020-11-09
Payer: MEDICAID

## 2020-11-09 VITALS
DIASTOLIC BLOOD PRESSURE: 71 MMHG | SYSTOLIC BLOOD PRESSURE: 117 MMHG | WEIGHT: 262 LBS | TEMPERATURE: 97 F | HEIGHT: 61 IN | BODY MASS INDEX: 49.47 KG/M2

## 2020-11-09 DIAGNOSIS — M70.62 GREATER TROCHANTERIC BURSITIS OF LEFT HIP: ICD-10-CM

## 2020-11-09 DIAGNOSIS — S83.241A ACUTE MEDIAL MENISCUS TEAR OF RIGHT KNEE, INITIAL ENCOUNTER: ICD-10-CM

## 2020-11-09 DIAGNOSIS — M23.41 LOOSE BODY OF RIGHT KNEE: ICD-10-CM

## 2020-11-09 DIAGNOSIS — M94.261 CHONDROMALACIA OF RIGHT KNEE: ICD-10-CM

## 2020-11-09 DIAGNOSIS — Z98.890 S/P RIGHT KNEE ARTHROSCOPY: Primary | ICD-10-CM

## 2020-11-09 PROCEDURE — 99999 PR PBB SHADOW E&M-EST. PATIENT-LVL IV: ICD-10-PCS | Mod: PBBFAC,,, | Performed by: ORTHOPAEDIC SURGERY

## 2020-11-09 PROCEDURE — 99024 POSTOP FOLLOW-UP VISIT: CPT | Mod: ,,, | Performed by: ORTHOPAEDIC SURGERY

## 2020-11-09 PROCEDURE — 99024 PR POST-OP FOLLOW-UP VISIT: ICD-10-PCS | Mod: ,,, | Performed by: ORTHOPAEDIC SURGERY

## 2020-11-09 PROCEDURE — 99214 OFFICE O/P EST MOD 30 MIN: CPT | Mod: PBBFAC | Performed by: ORTHOPAEDIC SURGERY

## 2020-11-09 PROCEDURE — 99999 PR PBB SHADOW E&M-EST. PATIENT-LVL IV: CPT | Mod: PBBFAC,,, | Performed by: ORTHOPAEDIC SURGERY

## 2020-11-09 NOTE — PATIENT INSTRUCTIONS
Continue taking Tylenol maximum 650 mg 3 times a day only if needed  Continue being active  Do not take Aleve or Advil over-the-counter since her diabetic or Motrin or ibuprofen  Return to see me as needed

## 2020-11-09 NOTE — PROGRESS NOTES
Subjective:     Patient ID: Ramya Swain is a 60 y.o. female.    Chief Complaint: Pain and Post-op Evaluation of the Right Knee and Pain of the Left Hip   09/25/2020   left hip pain  HPI:  60-year-old been having pain for 2-3 years now.  Cannot remember any specific trauma.  She occasionally does have back pain.  Treatment included physical therapy at [facility] [place].  Cannot take NSAIDs due to having 1 kidney and being diabetic.  Claims cannot sleep on the left side a bothers her.  She does have occasional low back pain.  She does take Tylenol but does not help.  Her workup included x-rays and MRI of her right knee.  Pain is 7/10.  She claims she has catching locking feeling unable to kneel unable to do stairs as well.  She feels occasionally about to fall catches herself because of the right knee.  Pain is episodic.  She is ambulating without any assistive devices.  Denies any fever or chills or shortness of breath or difficulty with chewing or swallowing loss of bowel bladder control or blurry vision double vision or loss of sense of smell or taste    11/09/2020  Date of surgery 10/02/2020 right knee arthroscopy partial medial meniscectomy removal of loose body with finding of chondromalacia also left hip injection.  Patient stated she is not having any hip in pain or any knee pain is very pleased with the results ambulating without any assistive devices.  Pain level 0/10 to both the right knee and left hip.  Very pleased with her results  Denies any fever or chills or shortness of breath or difficulty with chewing or swallowing loss of bowel bladder control or blurry vision double vision loss of sense of smell or taste or numbness or tingling in the legs  Past Medical History:   Diagnosis Date    Anxiety     Arthritis     CKD (chronic kidney disease), stage III     Congenital single kidney     Diabetes mellitus, type 2     GERD (gastroesophageal reflux disease)     Hyperlipidemia      Hypertension      Past Surgical History:   Procedure Laterality Date    ARTHROSCOPY OF KNEE Right 10/2/2020    Procedure: ARTHROSCOPY, KNEE;  Surgeon: Braxton Tierney MD;  Location: [facility] OR;  Service: Orthopedics;  Laterality: Right;  left greater trochanteric injection    BILATERAL OOPHORECTOMY      EXCISION OF MEDIAL MENISCUS OF KNEE Right 10/2/2020    Procedure: MENISCECTOMY, KNEE, MEDIAL;  Surgeon: Braxton Tierney MD;  Location: [facility] OR;  Service: Orthopedics;  Laterality: Right;  Partial medial meniscectomy    HYSTERECTOMY      INJECTION OF JOINT Left 10/2/2020    Procedure: INJECTION, JOINT, SHOULDER, HIP, OR KNEE;  Surgeon: Braxton Tierney MD;  Location: [facility] OR;  Service: Orthopedics;  Laterality: Left;    SYNOVECTOMY Right 10/2/2020    Procedure: SYNOVECTOMY;  Surgeon: Braxton Tierney MD;  Location: [facility] OR;  Service: Orthopedics;  Laterality: Right;    TUBAL LIGATION       Family History   Problem Relation Age of Onset    Hypertension Mother     Diabetes Mother     Stroke Father      Social History     Socioeconomic History    Marital status: Single     Spouse name: Not on file    Number of children: Not on file    Years of education: Not on file    Highest education level: Not on file   Occupational History    Not on file   Social Needs    Financial resource strain: Not on file    Food insecurity     Worry: Not on file     Inability: Not on file    Transportation needs     Medical: Not on file     Non-medical: Not on file   Tobacco Use    Smoking status: Never Smoker    Smokeless tobacco: Never Used   Substance and Sexual Activity    Alcohol use: Never     Frequency: Never    Drug use: Never    Sexual activity: Not on file   Lifestyle    Physical activity     Days per week: Not on file     Minutes per session: Not on file    Stress: Not on file   Relationships    Social connections     Talks on phone: Not on file     Gets together: Not on file     Attends  "Gnosticism service: Not on file     Active member of club or organization: Not on file     Attends meetings of clubs or organizations: Not on file     Relationship status: Not on file   Other Topics Concern    Not on file   Social History Narrative    Not on file     Medication List with Changes/Refills   Current Medications    ACCU-CHEK LUIS PLUS TEST STRP STRP    Test blood glucose BID    ACYCLOVIR (ZOVIRAX) 800 MG TAB    Take 1 tablet (800 mg total) by mouth once daily.    ALBUTEROL (PROVENTIL/VENTOLIN HFA) 90 MCG/ACTUATION INHALER    Inhale 2 puffs into the lungs every 6 (six) hours as needed for Wheezing.    ALPRAZOLAM (XANAX) 1 MG TABLET    Take 1 tablet (1 mg total) by mouth daily as needed.    AMLODIPINE (NORVASC) 10 MG TABLET    TAKE 1 TABLET BY MOUTH EVERY DAY    ATORVASTATIN (LIPITOR) 40 MG TABLET    Take 1 tablet (40 mg total) by mouth once daily.    BD ULTRA-FINE MINI PEN NEEDLE 31 GAUGE X 3/16" NDLE    USE DAILY WITH VICTOZA INJECTIONS    BLOOD-GLUCOSE METER (ACCU-CHEK LUIS PLUS METER) MISC    Use with test strips for home glucose monitoring daily    CARVEDILOL (COREG) 25 MG TABLET    TAKE 1 TABLET BY MOUTH TWICE A DAY    CLONIDINE (CATAPRES) 0.2 MG TABLET    TAKE 1 TABLET (0.2 MG TOTAL) BY MOUTH 2 (TWO) TIMES DAILY.    CYCLOBENZAPRINE (FLEXERIL) 10 MG TABLET    TAKE 1 TABLET BY MOUTH THREE TIMES A DAY AS NEEDED FOR MUSCLE SPASMS    FLUTICASONE PROPIONATE (FLONASE) 50 MCG/ACTUATION NASAL SPRAY    2 sprays by Each Nostril route daily as needed.     FUROSEMIDE (LASIX) 40 MG TABLET    Take 40 mg by mouth as needed.    GABAPENTIN (NEURONTIN) 300 MG CAPSULE    TAKE 1 CAPSULE BY MOUTH THREE TIMES A DAY    HYDROCHLOROTHIAZIDE (HYDRODIURIL) 25 MG TABLET    Take 1 tablet (25 mg total) by mouth once daily.    HYDROCODONE-ACETAMINOPHEN (NORCO) 5-325 MG PER TABLET    Take 1 tablet by mouth every 8 (eight) hours as needed for Pain.    LANCING DEVICE WITH LANCETS (ACCU-CHEK FASTCLIX LANCING DEV) KIT    Test blood " glucose BID    LIRAGLUTIDE 0.6 MG/0.1 ML, 18 MG/3 ML, SUBQ PNIJ (VICTOZA 2-JASPER) 0.6 MG/0.1 ML (18 MG/3 ML) PNIJ    INJECT 1.2MG UNDER SKIN DAILY    LOSARTAN (COZAAR) 100 MG TABLET    Take 1 tablet (100 mg total) by mouth daily as needed.    METFORMIN (GLUCOPHAGE-XR) 500 MG ER 24HR TABLET    TAKE 1 TABLET BY MOUTH TWICE A DAY    POLYETHYLENE GLYCOL (GLYCOLAX) 17 GRAM/DOSE POWDER    30 cc po BID. Mix with liquid    TERAZOSIN (HYTRIN) 2 MG CAPSULE    Take 1 capsule (2 mg total) by mouth every evening.     Review of patient's allergies indicates:  No Known Allergies  Review of Systems   Constitution: Negative for decreased appetite.   HENT: Negative for tinnitus.    Eyes: Negative for double vision.   Cardiovascular: Negative for chest pain.   Respiratory: Negative for wheezing.    Hematologic/Lymphatic: Negative for bleeding problem.   Skin: Negative for dry skin.   Musculoskeletal: Positive for back pain, joint pain and stiffness. Negative for arthritis, gout and neck pain.   Gastrointestinal: Negative for abdominal pain.   Genitourinary: Negative for bladder incontinence.   Neurological: Negative for numbness, paresthesias and sensory change.   Psychiatric/Behavioral: Negative for altered mental status.       Objective:   Body mass index is 49.5 kg/m².  Vitals:    11/09/20 0916   BP: 117/71   Temp: 97 °F (36.1 °C)          General    Constitutional: She is oriented to person, place, and time. She appears well-developed.   HENT:   Head: Atraumatic.   Eyes: EOM are normal.   Cardiovascular: Normal rate.    Pulmonary/Chest: Effort normal.   Neurological: She is alert and oriented to person, place, and time.   Psychiatric: Judgment normal.            Cervical rotation is very functional  Bilateral upper extremity neurovascularly intact.  Negative Tinel.  Strength is 5/5.  Radial and ulnar pulses 2+.  Lumbar with slight discomfort around L4-L5 paraspinal but no true deformity seen.  Negative straight leg raising  bilaterally  Left hip without pain to palpation over the greater trochanter.  Passive internal external rotation of both hips without pain in the groin.  Hip flexors, abductors, adductors, quads, hamstrings, ankle extensors and flexors, inverters and everters all 5/5  Right knee surgical scars healed well.  She has full extension full flexion.  No joint line tenderness no swelling.  Collaterals and cruciate stable  Left knee with very mild discomfort medially.  Mild crepitus.  Full range of motion.  Collaterals or cruciates are stable.  No swelling.  Calves are soft nontender with mild swelling  Multiple varicosities  Ankle motion intact  DP 1+ and PT 1+  Skin is warm to touch no obvious lesions    Relevant imaging results reviewed and interpreted by me, discussed with the patient and / or family today       X-ray in the electronic records of both of her knees 08/17/2020 showing some degenerative changes but joint space is fairly maintained bilaterally.  There is some osteophytic changes and some sclerosis  MRI 08/28/2020 with medial meniscus tear, loose bodies, osteophytic changes and some mild chondral thinning  Assessment:     Encounter Diagnoses   Name Primary?    S/P right knee arthroscopy Yes    Acute medial meniscus tear of right knee, initial encounter     Chondromalacia of right knee     Loose body of right knee     Greater trochanteric bursitis of left hip         Plan:   S/P right knee arthroscopy    Acute medial meniscus tear of right knee, initial encounter    Chondromalacia of right knee    Loose body of right knee    Greater trochanteric bursitis of left hip         Patient Instructions   Continue taking Tylenol maximum 650 mg 3 times a day only if needed  Continue being active  Do not take Aleve or Advil over-the-counter since her diabetic or Motrin or ibuprofen  Return to see me as needed          Disclaimer: This note was prepared using a voice recognition system and is likely to have sound  alike errors within the text.

## 2020-11-27 ENCOUNTER — TELEPHONE (OUTPATIENT)
Dept: ORTHOPEDICS | Facility: CLINIC | Age: 60
End: 2020-11-27

## 2020-11-27 DIAGNOSIS — M25.561 PAIN IN BOTH KNEES, UNSPECIFIED CHRONICITY: Primary | ICD-10-CM

## 2020-11-27 DIAGNOSIS — M25.562 PAIN IN BOTH KNEES, UNSPECIFIED CHRONICITY: Primary | ICD-10-CM

## 2020-12-03 ENCOUNTER — OFFICE VISIT (OUTPATIENT)
Dept: ORTHOPEDICS | Facility: CLINIC | Age: 60
End: 2020-12-03
Payer: MEDICAID

## 2020-12-03 ENCOUNTER — HOSPITAL ENCOUNTER (OUTPATIENT)
Dept: RADIOLOGY | Facility: HOSPITAL | Age: 60
Discharge: HOME | End: 2020-12-03
Attending: ORTHOPAEDIC SURGERY
Payer: MEDICAID

## 2020-12-03 VITALS
HEIGHT: 61 IN | BODY MASS INDEX: 50.03 KG/M2 | HEART RATE: 96 BPM | WEIGHT: 265 LBS | DIASTOLIC BLOOD PRESSURE: 80 MMHG | SYSTOLIC BLOOD PRESSURE: 154 MMHG

## 2020-12-03 DIAGNOSIS — S83.241A ACUTE MEDIAL MENISCUS TEAR OF RIGHT KNEE, INITIAL ENCOUNTER: ICD-10-CM

## 2020-12-03 DIAGNOSIS — M23.41 LOOSE BODY OF RIGHT KNEE: ICD-10-CM

## 2020-12-03 DIAGNOSIS — M70.62 GREATER TROCHANTERIC BURSITIS OF LEFT HIP: ICD-10-CM

## 2020-12-03 DIAGNOSIS — M94.261 CHONDROMALACIA OF RIGHT KNEE: ICD-10-CM

## 2020-12-03 DIAGNOSIS — Z98.890 S/P RIGHT KNEE ARTHROSCOPY: Primary | ICD-10-CM

## 2020-12-03 DIAGNOSIS — M25.561 PAIN IN BOTH KNEES, UNSPECIFIED CHRONICITY: ICD-10-CM

## 2020-12-03 DIAGNOSIS — M25.562 PAIN IN BOTH KNEES, UNSPECIFIED CHRONICITY: ICD-10-CM

## 2020-12-03 PROCEDURE — 73564 XR KNEE ORTHO BILAT WITH FLEXION: ICD-10-PCS | Mod: 26,50,, | Performed by: RADIOLOGY

## 2020-12-03 PROCEDURE — 99024 POSTOP FOLLOW-UP VISIT: CPT | Mod: ,,, | Performed by: ORTHOPAEDIC SURGERY

## 2020-12-03 PROCEDURE — 99214 OFFICE O/P EST MOD 30 MIN: CPT | Mod: PBBFAC,25 | Performed by: ORTHOPAEDIC SURGERY

## 2020-12-03 PROCEDURE — 73564 X-RAY EXAM KNEE 4 OR MORE: CPT | Mod: TC,50

## 2020-12-03 PROCEDURE — 99999 PR PBB SHADOW E&M-EST. PATIENT-LVL IV: CPT | Mod: PBBFAC,,, | Performed by: ORTHOPAEDIC SURGERY

## 2020-12-03 PROCEDURE — 73564 X-RAY EXAM KNEE 4 OR MORE: CPT | Mod: 26,50,, | Performed by: RADIOLOGY

## 2020-12-03 PROCEDURE — 99999 PR PBB SHADOW E&M-EST. PATIENT-LVL IV: ICD-10-PCS | Mod: PBBFAC,,, | Performed by: ORTHOPAEDIC SURGERY

## 2020-12-03 PROCEDURE — 99024 PR POST-OP FOLLOW-UP VISIT: ICD-10-PCS | Mod: ,,, | Performed by: ORTHOPAEDIC SURGERY

## 2020-12-03 NOTE — PROGRESS NOTES
Subjective:     Patient ID: Ramya Swain is a 60 y.o. female.    Chief Complaint: Pain of the Right Knee   09/25/2020   left hip pain  HPI:  60-year-old been having pain for 2-3 years now.  Cannot remember any specific trauma.  She occasionally does have back pain.  Treatment included physical therapy at [place].  Cannot take NSAIDs due to having 1 kidney and being diabetic.  Claims cannot sleep on the left side a bothers her.  She does have occasional low back pain.  She does take Tylenol but does not help.  Her workup included x-rays and MRI of her right knee.  Pain is 7/10.  She claims she has catching locking feeling unable to kneel unable to do stairs as well.  She feels occasionally about to fall catches herself because of the right knee.  Pain is episodic.  She is ambulating without any assistive devices.  Denies any fever or chills or shortness of breath or difficulty with chewing or swallowing loss of bowel bladder control or blurry vision double vision or loss of sense of smell or taste    11/09/2020  Date of surgery 10/02/2020 right knee arthroscopy partial medial meniscectomy removal of loose body with finding of chondromalacia also left hip injection.  Patient stated she is not having any hip in pain or any knee pain is very pleased with the results ambulating without any assistive devices.  Pain level 0/10 to both the right knee and left hip.  Very pleased with her results  Denies any fever or chills or shortness of breath or difficulty with chewing or swallowing loss of bowel bladder control or blurry vision double vision loss of sense of smell or taste or numbness or tingling in the legs       12/03/2020  Right knee arthroscopy 10/02/2020 with partial medial meniscectomy and removal of loose body and chondromalacia as well as injection of hip/hip is doing well.  Patient was doing really well until she tripped and fell on the right knee.  Date of injury 11/25/2020.  She wants to make sure  nothing is fractured.  Her pain is 4/10 and not so bad and able to function well.  Cannot take any NSAIDs due to chronic kidney disease and being diabetic.  She tries to take only Tylenol.  Denies any fever or chills or shortness of breath or difficulty with chewing or swallowing loss of bowel bladder control blurry vision or double vision or loss of sense smell or taste  Past Medical History:   Diagnosis Date    Anxiety     Arthritis     CKD (chronic kidney disease), stage III     Congenital single kidney     Diabetes mellitus, type 2     GERD (gastroesophageal reflux disease)     Hyperlipidemia     Hypertension      Past Surgical History:   Procedure Laterality Date    ARTHROSCOPY OF KNEE Right 10/2/2020    Procedure: ARTHROSCOPY, KNEE;  Surgeon: Bratxon Tierney MD;  Location: [facility] OR;  Service: Orthopedics;  Laterality: Right;  left greater trochanteric injection    BILATERAL OOPHORECTOMY      EXCISION OF MEDIAL MENISCUS OF KNEE Right 10/2/2020    Procedure: MENISCECTOMY, KNEE, MEDIAL;  Surgeon: Braxton Tierney MD;  Location: [facility] OR;  Service: Orthopedics;  Laterality: Right;  Partial medial meniscectomy    HYSTERECTOMY      INJECTION OF JOINT Left 10/2/2020    Procedure: INJECTION, JOINT, SHOULDER, HIP, OR KNEE;  Surgeon: Braxton Tierney MD;  Location: [facility] OR;  Service: Orthopedics;  Laterality: Left;    SYNOVECTOMY Right 10/2/2020    Procedure: SYNOVECTOMY;  Surgeon: Braxton Tierney MD;  Location: [facility] OR;  Service: Orthopedics;  Laterality: Right;    TUBAL LIGATION       Family History   Problem Relation Age of Onset    Hypertension Mother     Diabetes Mother     Stroke Father      Social History     Socioeconomic History    Marital status: Single     Spouse name: Not on file    Number of children: Not on file    Years of education: Not on file    Highest education level: Not on file   Occupational History    Not on file   Social Needs    Financial resource strain:  "Not on file    Food insecurity     Worry: Not on file     Inability: Not on file    Transportation needs     Medical: Not on file     Non-medical: Not on file   Tobacco Use    Smoking status: Never Smoker    Smokeless tobacco: Never Used   Substance and Sexual Activity    Alcohol use: Never     Frequency: Never    Drug use: Never    Sexual activity: Not on file   Lifestyle    Physical activity     Days per week: Not on file     Minutes per session: Not on file    Stress: Not on file   Relationships    Social connections     Talks on phone: Not on file     Gets together: Not on file     Attends Rastafarian service: Not on file     Active member of club or organization: Not on file     Attends meetings of clubs or organizations: Not on file     Relationship status: Not on file   Other Topics Concern    Not on file   Social History Narrative    Not on file     Medication List with Changes/Refills   Current Medications    ACCU-CHEK LUIS PLUS TEST STRP STRP    Test blood glucose BID    ACYCLOVIR (ZOVIRAX) 800 MG TAB    Take 1 tablet (800 mg total) by mouth once daily.    ALBUTEROL (PROVENTIL/VENTOLIN HFA) 90 MCG/ACTUATION INHALER    Inhale 2 puffs into the lungs every 6 (six) hours as needed for Wheezing.    ALPRAZOLAM (XANAX) 1 MG TABLET    Take 1 tablet (1 mg total) by mouth daily as needed.    AMLODIPINE (NORVASC) 10 MG TABLET    TAKE 1 TABLET BY MOUTH EVERY DAY    ATORVASTATIN (LIPITOR) 40 MG TABLET    Take 1 tablet (40 mg total) by mouth once daily.    BD ULTRA-FINE MINI PEN NEEDLE 31 GAUGE X 3/16" NDLE    USE DAILY WITH VICTOZA INJECTIONS    BLOOD-GLUCOSE METER (ACCU-CHEK LUIS PLUS METER) MISC    Use with test strips for home glucose monitoring daily    CARVEDILOL (COREG) 25 MG TABLET    TAKE 1 TABLET BY MOUTH TWICE A DAY    CLONIDINE (CATAPRES) 0.2 MG TABLET    TAKE 1 TABLET (0.2 MG TOTAL) BY MOUTH 2 (TWO) TIMES DAILY.    CYCLOBENZAPRINE (FLEXERIL) 10 MG TABLET    TAKE 1 TABLET BY MOUTH THREE TIMES A " DAY AS NEEDED FOR MUSCLE SPASMS    FLUTICASONE PROPIONATE (FLONASE) 50 MCG/ACTUATION NASAL SPRAY    2 sprays by Each Nostril route daily as needed.     FUROSEMIDE (LASIX) 40 MG TABLET    Take 1 tablet (40 mg total) by mouth daily as needed.    GABAPENTIN (NEURONTIN) 300 MG CAPSULE    Take 1 capsule (300 mg total) by mouth 3 (three) times daily.    HYDROCHLOROTHIAZIDE (HYDRODIURIL) 25 MG TABLET    Take 1 tablet (25 mg total) by mouth once daily.    LANCING DEVICE WITH LANCETS (ACCU-CHEK FASTCLIX LANCING DEV) KIT    Test blood glucose BID    LIRAGLUTIDE 0.6 MG/0.1 ML, 18 MG/3 ML, SUBQ PNIJ (VICTOZA 2-JASPER) 0.6 MG/0.1 ML (18 MG/3 ML) PNIJ    INJECT 1.2MG UNDER SKIN DAILY    LOSARTAN (COZAAR) 100 MG TABLET    Take 1 tablet (100 mg total) by mouth daily as needed.    METFORMIN (GLUCOPHAGE-XR) 500 MG ER 24HR TABLET    TAKE 1 TABLET BY MOUTH TWICE A DAY    TERAZOSIN (HYTRIN) 2 MG CAPSULE    Take 1 capsule (2 mg total) by mouth every evening.     Review of patient's allergies indicates:  No Known Allergies  Review of Systems   Constitution: Negative for decreased appetite.   HENT: Negative for tinnitus.    Eyes: Negative for double vision.   Cardiovascular: Negative for chest pain.   Respiratory: Negative for wheezing.    Hematologic/Lymphatic: Negative for bleeding problem.   Skin: Negative for dry skin.   Musculoskeletal: Positive for back pain, joint pain and stiffness. Negative for arthritis, gout and neck pain.   Gastrointestinal: Negative for abdominal pain.   Genitourinary: Negative for bladder incontinence.   Neurological: Negative for numbness, paresthesias and sensory change.   Psychiatric/Behavioral: Negative for altered mental status.       Objective:   Body mass index is 50.07 kg/m².  Vitals:    12/03/20 1319   BP: (!) 154/80   Pulse: 96          General    Constitutional: She is oriented to person, place, and time. She appears well-developed.   HENT:   Head: Atraumatic.   Eyes: EOM are normal.   Cardiovascular:  Normal rate.    Pulmonary/Chest: Effort normal.   Neurological: She is alert and oriented to person, place, and time.   Psychiatric: Judgment normal.            Cervical rotation is very functional  Bilateral upper extremity neurovascularly intact.  Negative Tinel.  Strength is 5/5.  Radial and ulnar pulses 2+.  Lumbar with slight discomfort around L4-L5 paraspinal but no true deformity seen.  Negative straight leg raising bilaterally  Left hip without pain to palpation over the greater trochanter.  Passive internal external rotation of both hips without pain in the groin.  Hip flexors, abductors, adductors, quads, hamstrings, ankle extensors and flexors, inverters and everters all 5/5  Right knee surgical scars healed well.  She has full extension full flexion.  No joint line tenderness on the lateral aspect mild tenderness on the medial joint line, no swelling.  Collaterals and cruciate stable, no ecchymosis  Left knee with very mild discomfort medially.  Mild crepitus.  Full range of motion.  Collaterals or cruciates are stable.  No swelling.  Calves are soft nontender with mild swelling  Multiple varicosities  Ankle motion intact  DP 1+ and PT 1+  Skin is warm to touch no obvious lesions    Relevant imaging results reviewed and interpreted by me, discussed with the patient and / or family today     12/03/2020 x-ray bilateral knees with medial joint mild to moderate narrowing with osteophytic changes on both knees.  No evidence of fracture.      X-ray in the electronic records of both of her knees 08/17/2020 showing some degenerative changes but joint space is fairly maintained bilaterally.  There is some osteophytic changes and some sclerosis  MRI 08/28/2020 with medial meniscus tear, loose bodies, osteophytic changes and some mild chondral thinning  Assessment:     Encounter Diagnoses   Name Primary?    S/P right knee arthroscopy Yes    Acute medial meniscus tear of right knee, initial encounter      Chondromalacia of right knee     Loose body of right knee     Greater trochanteric bursitis of left hip         Plan:   S/P right knee arthroscopy    Acute medial meniscus tear of right knee, initial encounter    Chondromalacia of right knee    Loose body of right knee    Greater trochanteric bursitis of left hip         Patient Instructions   X-ray of both knees just showing arthritis on the inside of the knee but no broken bones  You legs are swollen, make sure you take her blood pressure pills and the water pills    Continue with Tylenol maximum 650 mg 3 times a day as needed for pain  In the future we can give you cortisone injections or lubrication injections once the pain is bad.  Cortisone may mass subcu sugars so we will hold off until it is extremely painful  Return see me as needed          Disclaimer: This note was prepared using a voice recognition system and is likely to have sound alike errors within the text.

## 2020-12-03 NOTE — PATIENT INSTRUCTIONS
X-ray of both knees just showing arthritis on the inside of the knee but no broken bones  You legs are swollen, make sure you take her blood pressure pills and the water pills    Continue with Tylenol maximum 650 mg 3 times a day as needed for pain  In the future we can give you cortisone injections or lubrication injections once the pain is bad.  Cortisone may mass subcu sugars so we will hold off until it is extremely painful  Return see me as needed

## 2021-05-04 ENCOUNTER — TELEPHONE (OUTPATIENT)
Dept: ORTHOPEDICS | Facility: CLINIC | Age: 61
End: 2021-05-04

## 2021-06-02 DIAGNOSIS — M25.552 LEFT HIP PAIN: Primary | ICD-10-CM

## 2021-06-11 ENCOUNTER — OFFICE VISIT (OUTPATIENT)
Dept: ORTHOPEDICS | Facility: CLINIC | Age: 61
End: 2021-06-11
Payer: MEDICAID

## 2021-06-11 ENCOUNTER — HOSPITAL ENCOUNTER (OUTPATIENT)
Dept: RADIOLOGY | Facility: HOSPITAL | Age: 61
Discharge: HOME | End: 2021-06-11
Attending: ORTHOPAEDIC SURGERY
Payer: MEDICAID

## 2021-06-11 VITALS
SYSTOLIC BLOOD PRESSURE: 122 MMHG | HEART RATE: 106 BPM | DIASTOLIC BLOOD PRESSURE: 84 MMHG | HEIGHT: 61 IN | BODY MASS INDEX: 50.03 KG/M2 | WEIGHT: 265 LBS

## 2021-06-11 DIAGNOSIS — S82.61XA CLOSED LOW LATERAL MALLEOLUS FRACTURE, RIGHT, INITIAL ENCOUNTER: Primary | ICD-10-CM

## 2021-06-11 DIAGNOSIS — M70.62 GREATER TROCHANTERIC BURSITIS OF LEFT HIP: ICD-10-CM

## 2021-06-11 DIAGNOSIS — M25.552 LEFT HIP PAIN: ICD-10-CM

## 2021-06-11 DIAGNOSIS — M25.571 RIGHT ANKLE PAIN, UNSPECIFIED CHRONICITY: ICD-10-CM

## 2021-06-11 DIAGNOSIS — M23.41 LOOSE BODY OF RIGHT KNEE: ICD-10-CM

## 2021-06-11 DIAGNOSIS — S83.241D TEAR OF MEDIAL MENISCUS OF RIGHT KNEE, SUBSEQUENT ENCOUNTER: ICD-10-CM

## 2021-06-11 DIAGNOSIS — Z98.890 S/P RIGHT KNEE ARTHROSCOPY: ICD-10-CM

## 2021-06-11 DIAGNOSIS — M25.571 RIGHT ANKLE PAIN, UNSPECIFIED CHRONICITY: Primary | ICD-10-CM

## 2021-06-11 DIAGNOSIS — M94.261 CHONDROMALACIA OF RIGHT KNEE: ICD-10-CM

## 2021-06-11 PROCEDURE — 73502 X-RAY EXAM HIP UNI 2-3 VIEWS: CPT | Mod: TC,LT

## 2021-06-11 PROCEDURE — 73610 X-RAY EXAM OF ANKLE: CPT | Mod: 26,RT,, | Performed by: RADIOLOGY

## 2021-06-11 PROCEDURE — 73502 X-RAY EXAM HIP UNI 2-3 VIEWS: CPT | Mod: 26,LT,, | Performed by: RADIOLOGY

## 2021-06-11 PROCEDURE — 73610 X-RAY EXAM OF ANKLE: CPT | Mod: TC,RT

## 2021-06-11 PROCEDURE — 99214 PR OFFICE/OUTPT VISIT, EST, LEVL IV, 30-39 MIN: ICD-10-PCS | Mod: S$PBB,25,, | Performed by: ORTHOPAEDIC SURGERY

## 2021-06-11 PROCEDURE — 20610 DRAIN/INJ JOINT/BURSA W/O US: CPT | Mod: PBBFAC,LT | Performed by: ORTHOPAEDIC SURGERY

## 2021-06-11 PROCEDURE — 99999 PR PBB SHADOW E&M-EST. PATIENT-LVL IV: CPT | Mod: PBBFAC,,, | Performed by: ORTHOPAEDIC SURGERY

## 2021-06-11 PROCEDURE — 99999 PR PBB SHADOW E&M-EST. PATIENT-LVL IV: ICD-10-PCS | Mod: PBBFAC,,, | Performed by: ORTHOPAEDIC SURGERY

## 2021-06-11 PROCEDURE — 73610 XR ANKLE COMPLETE 3 VIEW RIGHT: ICD-10-PCS | Mod: 26,RT,, | Performed by: RADIOLOGY

## 2021-06-11 PROCEDURE — 73502 XR HIP 2 VIEW LEFT: ICD-10-PCS | Mod: 26,LT,, | Performed by: RADIOLOGY

## 2021-06-11 PROCEDURE — 99214 OFFICE O/P EST MOD 30 MIN: CPT | Mod: PBBFAC,25 | Performed by: ORTHOPAEDIC SURGERY

## 2021-06-11 PROCEDURE — 99214 OFFICE O/P EST MOD 30 MIN: CPT | Mod: S$PBB,25,, | Performed by: ORTHOPAEDIC SURGERY

## 2021-06-11 PROCEDURE — 20610 LARGE JOINT ASPIRATION/INJECTION: L GREATER TROCHANTERIC BURSA: ICD-10-PCS | Mod: S$PBB,LT,, | Performed by: ORTHOPAEDIC SURGERY

## 2021-06-11 RX ORDER — HYDROCODONE BITARTRATE AND ACETAMINOPHEN 10; 325 MG/1; MG/1
1 TABLET ORAL EVERY 6 HOURS PRN
Qty: 28 TABLET | Refills: 0 | Status: SHIPPED | OUTPATIENT
Start: 2021-06-11 | End: 2021-12-07

## 2021-06-11 RX ORDER — METHYLPREDNISOLONE ACETATE 80 MG/ML
80 INJECTION, SUSPENSION INTRA-ARTICULAR; INTRALESIONAL; INTRAMUSCULAR; SOFT TISSUE
Status: DISCONTINUED | OUTPATIENT
Start: 2021-06-11 | End: 2021-06-11 | Stop reason: HOSPADM

## 2021-06-11 RX ADMIN — METHYLPREDNISOLONE ACETATE 80 MG: 80 INJECTION, SUSPENSION INTRALESIONAL; INTRAMUSCULAR; INTRASYNOVIAL; SOFT TISSUE at 09:06

## 2021-06-21 ENCOUNTER — HOSPITAL ENCOUNTER (OUTPATIENT)
Dept: RADIOLOGY | Facility: HOSPITAL | Age: 61
Discharge: HOME | End: 2021-06-21
Attending: ORTHOPAEDIC SURGERY
Payer: MEDICAID

## 2021-06-21 ENCOUNTER — OFFICE VISIT (OUTPATIENT)
Dept: ORTHOPEDICS | Facility: CLINIC | Age: 61
End: 2021-06-21
Payer: MEDICAID

## 2021-06-21 VITALS
WEIGHT: 265 LBS | HEIGHT: 61 IN | HEART RATE: 98 BPM | BODY MASS INDEX: 50.03 KG/M2 | DIASTOLIC BLOOD PRESSURE: 86 MMHG | SYSTOLIC BLOOD PRESSURE: 130 MMHG

## 2021-06-21 DIAGNOSIS — M25.571 RIGHT ANKLE PAIN, UNSPECIFIED CHRONICITY: ICD-10-CM

## 2021-06-21 DIAGNOSIS — M70.62 GREATER TROCHANTERIC BURSITIS OF LEFT HIP: ICD-10-CM

## 2021-06-21 DIAGNOSIS — S83.241D TEAR OF MEDIAL MENISCUS OF RIGHT KNEE, SUBSEQUENT ENCOUNTER: ICD-10-CM

## 2021-06-21 DIAGNOSIS — Z98.890 S/P RIGHT KNEE ARTHROSCOPY: ICD-10-CM

## 2021-06-21 DIAGNOSIS — M94.261 CHONDROMALACIA OF RIGHT KNEE: ICD-10-CM

## 2021-06-21 DIAGNOSIS — S82.61XA CLOSED LOW LATERAL MALLEOLUS FRACTURE, RIGHT, INITIAL ENCOUNTER: Primary | ICD-10-CM

## 2021-06-21 DIAGNOSIS — S82.61XA CLOSED LOW LATERAL MALLEOLUS FRACTURE, RIGHT, INITIAL ENCOUNTER: ICD-10-CM

## 2021-06-21 DIAGNOSIS — M23.41 LOOSE BODY OF RIGHT KNEE: ICD-10-CM

## 2021-06-21 PROCEDURE — 99214 PR OFFICE/OUTPT VISIT, EST, LEVL IV, 30-39 MIN: ICD-10-PCS | Mod: S$PBB,,, | Performed by: ORTHOPAEDIC SURGERY

## 2021-06-21 PROCEDURE — 99214 OFFICE O/P EST MOD 30 MIN: CPT | Mod: PBBFAC,25 | Performed by: ORTHOPAEDIC SURGERY

## 2021-06-21 PROCEDURE — 73610 XR ANKLE COMPLETE 3 VIEW RIGHT: ICD-10-PCS | Mod: 26,RT,, | Performed by: RADIOLOGY

## 2021-06-21 PROCEDURE — 99999 PR PBB SHADOW E&M-EST. PATIENT-LVL IV: ICD-10-PCS | Mod: PBBFAC,,, | Performed by: ORTHOPAEDIC SURGERY

## 2021-06-21 PROCEDURE — 99214 OFFICE O/P EST MOD 30 MIN: CPT | Mod: S$PBB,,, | Performed by: ORTHOPAEDIC SURGERY

## 2021-06-21 PROCEDURE — 73610 X-RAY EXAM OF ANKLE: CPT | Mod: TC,RT

## 2021-06-21 PROCEDURE — 99999 PR PBB SHADOW E&M-EST. PATIENT-LVL IV: CPT | Mod: PBBFAC,,, | Performed by: ORTHOPAEDIC SURGERY

## 2021-06-21 PROCEDURE — 73610 X-RAY EXAM OF ANKLE: CPT | Mod: 26,RT,, | Performed by: RADIOLOGY

## 2021-06-28 ENCOUNTER — HOSPITAL ENCOUNTER (OUTPATIENT)
Dept: RADIOLOGY | Facility: HOSPITAL | Age: 61
Discharge: HOME | End: 2021-06-28
Attending: ORTHOPAEDIC SURGERY
Payer: MEDICAID

## 2021-06-28 ENCOUNTER — OFFICE VISIT (OUTPATIENT)
Dept: ORTHOPEDICS | Facility: CLINIC | Age: 61
End: 2021-06-28
Payer: MEDICAID

## 2021-06-28 VITALS
BODY MASS INDEX: 50.03 KG/M2 | HEART RATE: 88 BPM | HEIGHT: 61 IN | DIASTOLIC BLOOD PRESSURE: 84 MMHG | WEIGHT: 265 LBS | SYSTOLIC BLOOD PRESSURE: 139 MMHG

## 2021-06-28 DIAGNOSIS — Z98.890 S/P RIGHT KNEE ARTHROSCOPY: ICD-10-CM

## 2021-06-28 DIAGNOSIS — S82.61XD CLOSED LOW LATERAL MALLEOLUS FRACTURE, RIGHT, WITH ROUTINE HEALING, SUBSEQUENT ENCOUNTER: Primary | ICD-10-CM

## 2021-06-28 DIAGNOSIS — M25.571 RIGHT ANKLE PAIN, UNSPECIFIED CHRONICITY: ICD-10-CM

## 2021-06-28 DIAGNOSIS — S83.241D TEAR OF MEDIAL MENISCUS OF RIGHT KNEE, SUBSEQUENT ENCOUNTER: ICD-10-CM

## 2021-06-28 DIAGNOSIS — M25.571 RIGHT ANKLE PAIN, UNSPECIFIED CHRONICITY: Primary | ICD-10-CM

## 2021-06-28 DIAGNOSIS — M70.62 GREATER TROCHANTERIC BURSITIS OF LEFT HIP: ICD-10-CM

## 2021-06-28 DIAGNOSIS — M23.41 LOOSE BODY OF RIGHT KNEE: ICD-10-CM

## 2021-06-28 DIAGNOSIS — M70.61 GREATER TROCHANTERIC BURSITIS OF RIGHT HIP: ICD-10-CM

## 2021-06-28 DIAGNOSIS — S82.61XA CLOSED LOW LATERAL MALLEOLUS FRACTURE, RIGHT, INITIAL ENCOUNTER: ICD-10-CM

## 2021-06-28 DIAGNOSIS — M94.261 CHONDROMALACIA OF RIGHT KNEE: ICD-10-CM

## 2021-06-28 PROCEDURE — 73610 X-RAY EXAM OF ANKLE: CPT | Mod: TC,RT

## 2021-06-28 PROCEDURE — 99999 PR PBB SHADOW E&M-EST. PATIENT-LVL V: ICD-10-PCS | Mod: PBBFAC,,, | Performed by: ORTHOPAEDIC SURGERY

## 2021-06-28 PROCEDURE — 99215 OFFICE O/P EST HI 40 MIN: CPT | Mod: PBBFAC,25 | Performed by: ORTHOPAEDIC SURGERY

## 2021-06-28 PROCEDURE — 99213 OFFICE O/P EST LOW 20 MIN: CPT | Mod: S$PBB,,, | Performed by: ORTHOPAEDIC SURGERY

## 2021-06-28 PROCEDURE — 73610 XR ANKLE COMPLETE 3 VIEW RIGHT: ICD-10-PCS | Mod: 26,RT,, | Performed by: RADIOLOGY

## 2021-06-28 PROCEDURE — 73610 X-RAY EXAM OF ANKLE: CPT | Mod: 26,RT,, | Performed by: RADIOLOGY

## 2021-06-28 PROCEDURE — 99999 PR PBB SHADOW E&M-EST. PATIENT-LVL V: CPT | Mod: PBBFAC,,, | Performed by: ORTHOPAEDIC SURGERY

## 2021-06-28 PROCEDURE — 99213 PR OFFICE/OUTPT VISIT, EST, LEVL III, 20-29 MIN: ICD-10-PCS | Mod: S$PBB,,, | Performed by: ORTHOPAEDIC SURGERY

## 2021-07-15 ENCOUNTER — HOSPITAL ENCOUNTER (OUTPATIENT)
Dept: RADIOLOGY | Facility: HOSPITAL | Age: 61
Discharge: HOME | End: 2021-07-15
Attending: ORTHOPAEDIC SURGERY
Payer: MEDICAID

## 2021-07-15 ENCOUNTER — OFFICE VISIT (OUTPATIENT)
Dept: ORTHOPEDICS | Facility: CLINIC | Age: 61
End: 2021-07-15
Payer: MEDICAID

## 2021-07-15 VITALS
SYSTOLIC BLOOD PRESSURE: 113 MMHG | DIASTOLIC BLOOD PRESSURE: 75 MMHG | HEIGHT: 61 IN | HEART RATE: 85 BPM | WEIGHT: 265 LBS | BODY MASS INDEX: 50.03 KG/M2

## 2021-07-15 DIAGNOSIS — M25.571 RIGHT ANKLE PAIN, UNSPECIFIED CHRONICITY: Primary | ICD-10-CM

## 2021-07-15 DIAGNOSIS — M25.571 RIGHT ANKLE PAIN, UNSPECIFIED CHRONICITY: ICD-10-CM

## 2021-07-15 DIAGNOSIS — M23.41 LOOSE BODY OF RIGHT KNEE: ICD-10-CM

## 2021-07-15 DIAGNOSIS — M70.62 GREATER TROCHANTERIC BURSITIS OF LEFT HIP: ICD-10-CM

## 2021-07-15 DIAGNOSIS — S83.241D TEAR OF MEDIAL MENISCUS OF RIGHT KNEE, SUBSEQUENT ENCOUNTER: ICD-10-CM

## 2021-07-15 DIAGNOSIS — M70.61 GREATER TROCHANTERIC BURSITIS OF RIGHT HIP: ICD-10-CM

## 2021-07-15 DIAGNOSIS — S82.61XD CLOSED LOW LATERAL MALLEOLUS FRACTURE, RIGHT, WITH ROUTINE HEALING, SUBSEQUENT ENCOUNTER: Primary | ICD-10-CM

## 2021-07-15 DIAGNOSIS — Z98.890 S/P RIGHT KNEE ARTHROSCOPY: ICD-10-CM

## 2021-07-15 DIAGNOSIS — M94.261 CHONDROMALACIA OF RIGHT KNEE: ICD-10-CM

## 2021-07-15 PROCEDURE — 73610 X-RAY EXAM OF ANKLE: CPT | Mod: 26,RT,, | Performed by: RADIOLOGY

## 2021-07-15 PROCEDURE — 99213 OFFICE O/P EST LOW 20 MIN: CPT | Mod: S$PBB,,, | Performed by: ORTHOPAEDIC SURGERY

## 2021-07-15 PROCEDURE — 99213 PR OFFICE/OUTPT VISIT, EST, LEVL III, 20-29 MIN: ICD-10-PCS | Mod: S$PBB,,, | Performed by: ORTHOPAEDIC SURGERY

## 2021-07-15 PROCEDURE — 99999 PR PBB SHADOW E&M-EST. PATIENT-LVL IV: CPT | Mod: PBBFAC,,, | Performed by: ORTHOPAEDIC SURGERY

## 2021-07-15 PROCEDURE — 99999 PR PBB SHADOW E&M-EST. PATIENT-LVL IV: ICD-10-PCS | Mod: PBBFAC,,, | Performed by: ORTHOPAEDIC SURGERY

## 2021-07-15 PROCEDURE — 73610 XR ANKLE COMPLETE 3 VIEW RIGHT: ICD-10-PCS | Mod: 26,RT,, | Performed by: RADIOLOGY

## 2021-07-15 PROCEDURE — 99214 OFFICE O/P EST MOD 30 MIN: CPT | Mod: PBBFAC | Performed by: ORTHOPAEDIC SURGERY

## 2021-07-15 PROCEDURE — 73610 X-RAY EXAM OF ANKLE: CPT | Mod: TC,RT

## 2021-08-12 ENCOUNTER — HOSPITAL ENCOUNTER (OUTPATIENT)
Dept: RADIOLOGY | Facility: HOSPITAL | Age: 61
Discharge: HOME | End: 2021-08-12
Attending: ORTHOPAEDIC SURGERY
Payer: MEDICAID

## 2021-08-12 ENCOUNTER — OFFICE VISIT (OUTPATIENT)
Dept: ORTHOPEDICS | Facility: CLINIC | Age: 61
End: 2021-08-12
Payer: MEDICAID

## 2021-08-12 VITALS
DIASTOLIC BLOOD PRESSURE: 64 MMHG | WEIGHT: 278 LBS | BODY MASS INDEX: 52.49 KG/M2 | RESPIRATION RATE: 20 BRPM | HEIGHT: 61 IN | HEART RATE: 88 BPM | SYSTOLIC BLOOD PRESSURE: 106 MMHG

## 2021-08-12 DIAGNOSIS — S83.241D TEAR OF MEDIAL MENISCUS OF RIGHT KNEE, SUBSEQUENT ENCOUNTER: ICD-10-CM

## 2021-08-12 DIAGNOSIS — M70.61 GREATER TROCHANTERIC BURSITIS OF RIGHT HIP: ICD-10-CM

## 2021-08-12 DIAGNOSIS — S82.61XD CLOSED LOW LATERAL MALLEOLUS FRACTURE, RIGHT, WITH ROUTINE HEALING, SUBSEQUENT ENCOUNTER: ICD-10-CM

## 2021-08-12 DIAGNOSIS — Z98.890 S/P RIGHT KNEE ARTHROSCOPY: ICD-10-CM

## 2021-08-12 DIAGNOSIS — M25.571 RIGHT ANKLE PAIN, UNSPECIFIED CHRONICITY: ICD-10-CM

## 2021-08-12 DIAGNOSIS — M70.62 GREATER TROCHANTERIC BURSITIS OF LEFT HIP: Primary | ICD-10-CM

## 2021-08-12 DIAGNOSIS — M17.12 ARTHRITIS OF KNEE, LEFT: ICD-10-CM

## 2021-08-12 DIAGNOSIS — M17.11 ARTHRITIS OF KNEE, RIGHT: ICD-10-CM

## 2021-08-12 PROCEDURE — 99999 PR PBB SHADOW E&M-EST. PATIENT-LVL IV: CPT | Mod: PBBFAC,,, | Performed by: ORTHOPAEDIC SURGERY

## 2021-08-12 PROCEDURE — 99999 PR PBB SHADOW E&M-EST. PATIENT-LVL IV: ICD-10-PCS | Mod: PBBFAC,,, | Performed by: ORTHOPAEDIC SURGERY

## 2021-08-12 PROCEDURE — 20610 DRAIN/INJ JOINT/BURSA W/O US: CPT | Mod: PBBFAC | Performed by: ORTHOPAEDIC SURGERY

## 2021-08-12 PROCEDURE — 20610 LARGE JOINT ASPIRATION/INJECTION: L GREATER TROCHANTERIC BURSA: ICD-10-PCS | Mod: S$PBB,LT,, | Performed by: ORTHOPAEDIC SURGERY

## 2021-08-12 PROCEDURE — 73610 X-RAY EXAM OF ANKLE: CPT | Mod: 26,RT,, | Performed by: RADIOLOGY

## 2021-08-12 PROCEDURE — 73610 XR ANKLE COMPLETE 3 VIEW RIGHT: ICD-10-PCS | Mod: 26,RT,, | Performed by: RADIOLOGY

## 2021-08-12 PROCEDURE — 99214 OFFICE O/P EST MOD 30 MIN: CPT | Mod: PBBFAC,25 | Performed by: ORTHOPAEDIC SURGERY

## 2021-08-12 PROCEDURE — 73610 X-RAY EXAM OF ANKLE: CPT | Mod: TC,RT

## 2021-08-12 PROCEDURE — 99214 PR OFFICE/OUTPT VISIT, EST, LEVL IV, 30-39 MIN: ICD-10-PCS | Mod: 25,S$PBB,, | Performed by: ORTHOPAEDIC SURGERY

## 2021-08-12 PROCEDURE — 99214 OFFICE O/P EST MOD 30 MIN: CPT | Mod: 25,S$PBB,, | Performed by: ORTHOPAEDIC SURGERY

## 2021-08-12 RX ORDER — METHYLPREDNISOLONE ACETATE 80 MG/ML
80 INJECTION, SUSPENSION INTRA-ARTICULAR; INTRALESIONAL; INTRAMUSCULAR; SOFT TISSUE
Status: DISCONTINUED | OUTPATIENT
Start: 2021-08-12 | End: 2021-08-12 | Stop reason: HOSPADM

## 2021-08-12 RX ADMIN — METHYLPREDNISOLONE ACETATE 80 MG: 80 INJECTION, SUSPENSION INTRALESIONAL; INTRAMUSCULAR; INTRASYNOVIAL; SOFT TISSUE at 09:08

## 2021-11-12 ENCOUNTER — OFFICE VISIT (OUTPATIENT)
Dept: ORTHOPEDICS | Facility: CLINIC | Age: 61
End: 2021-11-12
Payer: MEDICAID

## 2021-11-12 VITALS
WEIGHT: 276.81 LBS | SYSTOLIC BLOOD PRESSURE: 127 MMHG | HEART RATE: 82 BPM | BODY MASS INDEX: 52.26 KG/M2 | HEIGHT: 61 IN | DIASTOLIC BLOOD PRESSURE: 76 MMHG

## 2021-11-12 DIAGNOSIS — M17.12 ARTHRITIS OF KNEE, LEFT: ICD-10-CM

## 2021-11-12 DIAGNOSIS — M25.571 RIGHT ANKLE PAIN, UNSPECIFIED CHRONICITY: ICD-10-CM

## 2021-11-12 DIAGNOSIS — S82.61XD CLOSED LOW LATERAL MALLEOLUS FRACTURE, RIGHT, WITH ROUTINE HEALING, SUBSEQUENT ENCOUNTER: ICD-10-CM

## 2021-11-12 DIAGNOSIS — M70.62 GREATER TROCHANTERIC BURSITIS OF LEFT HIP: Primary | ICD-10-CM

## 2021-11-12 DIAGNOSIS — M70.61 GREATER TROCHANTERIC BURSITIS OF RIGHT HIP: ICD-10-CM

## 2021-11-12 DIAGNOSIS — Z98.890 S/P RIGHT KNEE ARTHROSCOPY: ICD-10-CM

## 2021-11-12 PROCEDURE — 99214 OFFICE O/P EST MOD 30 MIN: CPT | Mod: PBBFAC | Performed by: ORTHOPAEDIC SURGERY

## 2021-11-12 PROCEDURE — 99214 OFFICE O/P EST MOD 30 MIN: CPT | Mod: S$PBB,,, | Performed by: ORTHOPAEDIC SURGERY

## 2021-11-12 PROCEDURE — 99214 PR OFFICE/OUTPT VISIT, EST, LEVL IV, 30-39 MIN: ICD-10-PCS | Mod: S$PBB,,, | Performed by: ORTHOPAEDIC SURGERY

## 2021-11-12 PROCEDURE — 99999 PR PBB SHADOW E&M-EST. PATIENT-LVL IV: ICD-10-PCS | Mod: PBBFAC,,, | Performed by: ORTHOPAEDIC SURGERY

## 2021-11-12 PROCEDURE — 99999 PR PBB SHADOW E&M-EST. PATIENT-LVL IV: CPT | Mod: PBBFAC,,, | Performed by: ORTHOPAEDIC SURGERY

## 2021-11-12 RX ORDER — PREDNISONE 5 MG/1
5 TABLET ORAL DAILY
Qty: 30 TABLET | Refills: 0 | Status: SHIPPED | OUTPATIENT
Start: 2021-11-12 | End: 2021-12-07

## 2022-01-28 ENCOUNTER — OFFICE VISIT (OUTPATIENT)
Dept: ORTHOPEDICS | Facility: CLINIC | Age: 62
End: 2022-01-28
Payer: MEDICAID

## 2022-01-28 VITALS — BODY MASS INDEX: 52.51 KG/M2 | HEIGHT: 61 IN | WEIGHT: 278.13 LBS

## 2022-01-28 DIAGNOSIS — M17.12 ARTHRITIS OF KNEE, LEFT: ICD-10-CM

## 2022-01-28 DIAGNOSIS — M70.62 GREATER TROCHANTERIC BURSITIS OF LEFT HIP: ICD-10-CM

## 2022-01-28 DIAGNOSIS — Z98.890 S/P RIGHT KNEE ARTHROSCOPY: ICD-10-CM

## 2022-01-28 DIAGNOSIS — S82.61XD CLOSED LOW LATERAL MALLEOLUS FRACTURE, RIGHT, WITH ROUTINE HEALING, SUBSEQUENT ENCOUNTER: ICD-10-CM

## 2022-01-28 DIAGNOSIS — M70.61 GREATER TROCHANTERIC BURSITIS OF RIGHT HIP: ICD-10-CM

## 2022-01-28 DIAGNOSIS — M77.8 LEFT SHOULDER TENDINITIS: Primary | ICD-10-CM

## 2022-01-28 PROCEDURE — 3008F PR BODY MASS INDEX (BMI) DOCUMENTED: ICD-10-PCS | Mod: CPTII,,, | Performed by: ORTHOPAEDIC SURGERY

## 2022-01-28 PROCEDURE — 3061F PR NEG MICROALBUMINURIA RESULT DOCUMENTED/REVIEW: ICD-10-PCS | Mod: CPTII,,, | Performed by: ORTHOPAEDIC SURGERY

## 2022-01-28 PROCEDURE — 3008F BODY MASS INDEX DOCD: CPT | Mod: CPTII,,, | Performed by: ORTHOPAEDIC SURGERY

## 2022-01-28 PROCEDURE — 99214 OFFICE O/P EST MOD 30 MIN: CPT | Mod: 25,S$PBB,, | Performed by: ORTHOPAEDIC SURGERY

## 2022-01-28 PROCEDURE — 99999 PR PBB SHADOW E&M-EST. PATIENT-LVL IV: CPT | Mod: PBBFAC,,, | Performed by: ORTHOPAEDIC SURGERY

## 2022-01-28 PROCEDURE — 4010F ACE/ARB THERAPY RXD/TAKEN: CPT | Mod: CPTII,,, | Performed by: ORTHOPAEDIC SURGERY

## 2022-01-28 PROCEDURE — 4010F PR ACE/ARB THEARPY RXD/TAKEN: ICD-10-PCS | Mod: CPTII,,, | Performed by: ORTHOPAEDIC SURGERY

## 2022-01-28 PROCEDURE — 1159F MED LIST DOCD IN RCRD: CPT | Mod: CPTII,,, | Performed by: ORTHOPAEDIC SURGERY

## 2022-01-28 PROCEDURE — 3066F PR DOCUMENTATION OF TREATMENT FOR NEPHROPATHY: ICD-10-PCS | Mod: CPTII,,, | Performed by: ORTHOPAEDIC SURGERY

## 2022-01-28 PROCEDURE — 99214 PR OFFICE/OUTPT VISIT, EST, LEVL IV, 30-39 MIN: ICD-10-PCS | Mod: 25,S$PBB,, | Performed by: ORTHOPAEDIC SURGERY

## 2022-01-28 PROCEDURE — 3061F NEG MICROALBUMINURIA REV: CPT | Mod: CPTII,,, | Performed by: ORTHOPAEDIC SURGERY

## 2022-01-28 PROCEDURE — 20610 DRAIN/INJ JOINT/BURSA W/O US: CPT | Mod: PBBFAC,LT | Performed by: ORTHOPAEDIC SURGERY

## 2022-01-28 PROCEDURE — 1159F PR MEDICATION LIST DOCUMENTED IN MEDICAL RECORD: ICD-10-PCS | Mod: CPTII,,, | Performed by: ORTHOPAEDIC SURGERY

## 2022-01-28 PROCEDURE — 20610 LARGE JOINT ASPIRATION/INJECTION: L SUBACROMIAL BURSA: ICD-10-PCS | Mod: S$PBB,LT,, | Performed by: ORTHOPAEDIC SURGERY

## 2022-01-28 PROCEDURE — 99214 OFFICE O/P EST MOD 30 MIN: CPT | Mod: PBBFAC,25 | Performed by: ORTHOPAEDIC SURGERY

## 2022-01-28 PROCEDURE — 3066F NEPHROPATHY DOC TX: CPT | Mod: CPTII,,, | Performed by: ORTHOPAEDIC SURGERY

## 2022-01-28 PROCEDURE — 99999 PR PBB SHADOW E&M-EST. PATIENT-LVL IV: ICD-10-PCS | Mod: PBBFAC,,, | Performed by: ORTHOPAEDIC SURGERY

## 2022-01-28 RX ORDER — METHYLPREDNISOLONE ACETATE 40 MG/ML
40 INJECTION, SUSPENSION INTRA-ARTICULAR; INTRALESIONAL; INTRAMUSCULAR; SOFT TISSUE
Status: DISCONTINUED | OUTPATIENT
Start: 2022-01-28 | End: 2022-01-28 | Stop reason: HOSPADM

## 2022-01-28 RX ADMIN — METHYLPREDNISOLONE ACETATE 40 MG: 40 INJECTION, SUSPENSION INTRALESIONAL; INTRAMUSCULAR; INTRASYNOVIAL; SOFT TISSUE at 11:01

## 2022-01-28 NOTE — PROCEDURES
Large Joint Aspiration/Injection: L subacromial bursa    Date/Time: 1/28/2022 11:00 AM  Performed by: Braxton Tierney MD  Authorized by: Braxton Tierney MD     Consent Done?:  Yes (Verbal)  Site marked: the procedure site was marked    Timeout: prior to procedure the correct patient, procedure, and site was verified      Local anesthesia used?: Yes    Local anesthetic:  Lidocaine 1% without epinephrine  Approach:  Posterior  Location:  Shoulder  Site:  L subacromial bursa  Medications:  40 mg methylPREDNISolone acetate 40 mg/mL  Patient tolerance:  Patient tolerated the procedure well with no immediate complications

## 2022-01-28 NOTE — PROGRESS NOTES
Subjective:     Patient ID: Ramya Swain is a 61 y.o. female.    Chief Complaint: Pain of the Left Knee   09/25/2020   left hip pain  HPI:  60-year-old been having pain for 2-3 years now.  Cannot remember any specific trauma.  She occasionally does have back pain.  Treatment included physical therapy at [facility].  Cannot take NSAIDs due to having 1 kidney and being diabetic.  Claims cannot sleep on the left side a bothers her.  She does have occasional low back pain.  She does take Tylenol but does not help.  Her workup included x-rays and MRI of her right knee.  Pain is 7/10.  She claims she has catching locking feeling unable to kneel unable to do stairs as well.  She feels occasionally about to fall catches herself because of the right knee.  Pain is episodic.  She is ambulating without any assistive devices.  Denies any fever or chills or shortness of breath or difficulty with chewing or swallowing loss of bowel bladder control or blurry vision double vision or loss of sense of smell or taste    11/09/2020  Date of surgery 10/02/2020 right knee arthroscopy partial medial meniscectomy removal of loose body with finding of chondromalacia also left hip injection.  Patient stated she is not having any hip in pain or any knee pain is very pleased with the results ambulating without any assistive devices.  Pain level 0/10 to both the right knee and left hip.  Very pleased with her results  Denies any fever or chills or shortness of breath or difficulty with chewing or swallowing loss of bowel bladder control or blurry vision double vision loss of sense of smell or taste or numbness or tingling in the legs       12/03/2020  Right knee arthroscopy 10/02/2020 with partial medial meniscectomy and removal of loose body and chondromalacia as well as injection of hip/hip is doing well.  Patient was doing really well until she tripped and fell on the right knee.  Date of injury 11/25/2020.  She wants to make sure  nothing is fractured.  Her pain is 4/10 and not so bad and able to function well.  Cannot take any NSAIDs due to chronic kidney disease and being diabetic.  She tries to take only Tylenol.  Denies any fever or chills or shortness of breath or difficulty with chewing or swallowing loss of bowel bladder control blurry vision or double vision or loss of sense smell or taste      06/11/2021  Chief complaint severe right ankle pain, left hip pain.  Patient stated she fell on May 29 in a picnic.  Presents to her primary care they did x-ray.  She showed up with a CD.  She is hurting severe and with ankle swollen.  She is ambulating without any assistive devices.  There was no splinting or anything on the ankle.  She does take gabapentin.  Previously was on tramadol that did not seem to help.  I did scope her knee years ago and that knee is doing okay.  Occasional back pain occasionally and can not sleep on that left hip and sometimes cannot sit on the left hip.  No fever no chills no shortness of breath or difficulty with chewing or swallowing loss of bowel bladder control blurry vision double vision loss sense smell taste    06/21/2021  Right ankle lateral malleolus fracture with 1 mm displacement at the joint line.  Opted to observe this on a weekly basis.  She was placed in clamshell type of a boot and allowed to weight bear as tolerated.  I did tell her if I see displacement on the fracture to on acceptable alignment she will need ORIF with plates and screws.  We gave her Norco last time and that seems to have eased up things her pain is still there at 7/10 but not as bad as it was last visit.  As far as her left hip is concerned that was injected last visit and is markedly improved.  She is walking with the cam walker boot.  She states that she is wearing it 24 hours like I instructed her last time only take it out to wash in put her socks on  Right knee is doing a little bit better the ankle is well bothers her the  most at this time   No fever no chills no shortness of breath difficulty with chewing or swallowing loss of bowel bladder control blurry vision double vision or loss of sense smell or taste    06/28/2021 right ankle lateral malleolus with 1 mm displacement at the mortise.  We treating her non operatively in following her on a weekly basis to make sure it has not moved.  Last week there was callus formation on the x-ray and the pain has slowly getting better.  As far as other problems she has slight bursitis of her hip that is doing much better at this time also she has early degeneration of her knee status post arthroscopic surgery and that is not bother her as much as her ankle at this time.    No fever no chills no shortness of breath no difficulty with chewing or swallowing or loss of bowel bladder control blurry vision double vision loss sense smell or taste    Patient complain today of slight discomfort over the right greater trochanter which is new.  No pain in the groin.  Pain is still 7/10.  Date of injury of the right ankle May 29, 2021      07/15/2021  Right ankle lateral malleolus fracture.  Date of injury 05/29/2021  Pain in the ankle is doing much better at 3/10.  The left hip started to hurt more we did injected on 06/11/2021.  As far as the knees in concerned a little and tender.  She is very pleased that we taking care of her ankle fracture.  Ambulating with a cam walker boot.  Not wearing it at night at this time.  No fever no chills or shortness of breath or difficulty with chewing or swallowing loss of bowel bladder control blurry vision double vision loss sense smell or taste    08/12/21  Right ankle lateral malleolus fracture 05/29/2021.  We opted to treat her non operatively since the mortise was maintained and less than 2 mm of displacement.  She is forming quite a bit of callus formation.  Pain is improving however swelling is there.  Occasional pain at 5/10.  What concerns her the most is  the left hip she cannot sleep on that side and occasionally the right.  The left knee does not bother.  She does have bursitis of her hips and reinjected it on the left in 06/11/2021.  Also she has arthritis in both of her knees with medial joint narrowing and already had an arthroscopic surgery on the right knee.  The knee pain is tolerable at this time.  Ambulating without any assistive devices.    No fever no chills no shortness of breath no difficulty with chewing or swallowing or loss of bowel bladder control but her vision or double vision or chest pain or calf pain.    11/12/2021  Bilateral hip pains.  His last visit 08/12/2021 was given injection into the left hip of steroid without much success.  She still having both hips hurting her with the left worse than right unable to sleep on both sides.  She has chronic kidney disease and diabetes but cannot give her any NSAIDs.  She is ambulating without any assistive devices.  As far as the right ankle lateral malleolus fracture is not bothering her at this time and not wearing any braces.  No fever no chills no shortness of breath or difficulty with chewing or swallowing loss of bladder control    01/28/2022  Left shoulder pain  Patient stated no trauma over the last 3- 4 days started with the shoulder pain.  Unable to sleep on it.  Difficulty with raising it above her shoulder level.  I did review her hemoglobin A1c and there was an increase up to 8.8 from 7.3.  Around the holidays she was eating a lot a carbohydrates and sweets.  I did tell her that she needs to control her sugars better.  Last time she was here because she has only 1 kidney I placed her on prednisone low does to help out with the hips and send her to therapy.  Her hips are doing really well the prednisone messed up a little bit her sugar but not so bad.  She could not go to physical therapy due to transportation issues.  She just started doing some exercises a walking on her own.  Pain around  6/10 on the shoulder and 2/10 on the hips in 0/10 on the ankle on the right and in right knee.  No numbness no tingling in her hands or feet  Past Medical History:   Diagnosis Date    Anxiety     Arthritis     CKD (chronic kidney disease), stage III     Congenital single kidney     Diabetes mellitus, type 2     GERD (gastroesophageal reflux disease)     Hyperlipidemia     Hypertension      Past Surgical History:   Procedure Laterality Date    ARTHROSCOPY OF KNEE Right 10/2/2020    Procedure: ARTHROSCOPY, KNEE;  Surgeon: Braxton Tierney MD;  Location: [facility] OR;  Service: Orthopedics;  Laterality: Right;  left greater trochanteric injection    BILATERAL OOPHORECTOMY      EXCISION OF MEDIAL MENISCUS OF KNEE Right 10/2/2020    Procedure: MENISCECTOMY, KNEE, MEDIAL;  Surgeon: Braxton Tierney MD;  Location: [facility] OR;  Service: Orthopedics;  Laterality: Right;  Partial medial meniscectomy    HYSTERECTOMY      INJECTION OF JOINT Left 10/2/2020    Procedure: INJECTION, JOINT, SHOULDER, HIP, OR KNEE;  Surgeon: Braxton Tierney MD;  Location: [facility] OR;  Service: Orthopedics;  Laterality: Left;    SYNOVECTOMY Right 10/2/2020    Procedure: SYNOVECTOMY;  Surgeon: Braxton Tierney MD;  Location: [facility] OR;  Service: Orthopedics;  Laterality: Right;    TUBAL LIGATION       Family History   Problem Relation Age of Onset    Hypertension Mother     Diabetes Mother     Stroke Father      Social History     Socioeconomic History    Marital status: Single   Tobacco Use    Smoking status: Never Smoker    Smokeless tobacco: Never Used   Substance and Sexual Activity    Alcohol use: Never    Drug use: Never    Sexual activity: Not Currently     Partners: Male     Medication List with Changes/Refills   Current Medications    ACYCLOVIR (ZOVIRAX) 800 MG TAB    Take 1 tablet (800 mg total) by mouth once daily.    ALBUTEROL (PROVENTIL) 2.5 MG /3 ML (0.083 %) NEBULIZER SOLUTION    Take 3 mLs (2.5 mg total) by  nebulization every 4 to 6 hours as needed for Wheezing. Rescue    ALBUTEROL (PROVENTIL/VENTOLIN HFA) 90 MCG/ACTUATION INHALER    TAKE 2 PUFFS BY MOUTH EVERY 6 HOURS AS NEEDED FOR WHEEZE    ALPRAZOLAM (XANAX) 1 MG TABLET    Take 1 tablet (1 mg total) by mouth daily as needed for Anxiety.    AMLODIPINE (NORVASC) 10 MG TABLET    TAKE 1 TABLET BY MOUTH EVERY DAY    ATORVASTATIN (LIPITOR) 40 MG TABLET    TAKE 1 TABLET BY MOUTH EVERY DAY    BLOOD SUGAR DIAGNOSTIC (ACCU-CHEK LUIS PLUS TEST STRP) STRP    TEST BLOOD GLUCOSE TWICE A DAY    BLOOD-GLUCOSE METER (ACCU-CHEK LUIS PLUS METER) MISC    Use with test strips for home glucose monitoring daily    CARVEDILOL (COREG) 25 MG TABLET    TAKE ONE TABLET BY MOUTH TWICE DAILY    CETIRIZINE (ZYRTEC) 10 MG TABLET    Take 1 tablet (10 mg total) by mouth once daily.    CLONIDINE (CATAPRES) 0.2 MG TABLET    TAKE 1 TABLET (0.2 MG TOTAL) BY MOUTH 2 (TWO) TIMES DAILY.    CYCLOBENZAPRINE (FLEXERIL) 10 MG TABLET    Take 1 tablet (10 mg total) by mouth 3 (three) times daily as needed for Muscle spasms.    FLUTICASONE PROPIONATE (FLONASE) 50 MCG/ACTUATION NASAL SPRAY    2 sprays by Each Nostril route daily as needed.     FUROSEMIDE (LASIX) 40 MG TABLET    TAKE 1 TABLET BY MOUTH EVERY DAY AS NEEDED    GABAPENTIN (NEURONTIN) 300 MG CAPSULE    TAKE 1 CAPSULE BY MOUTH THREE TIMES A DAY    HYDROCHLOROTHIAZIDE (HYDRODIURIL) 25 MG TABLET    Take 1 tablet (25 mg total) by mouth once daily.    INSULIN (LANTUS SOLOSTAR U-100 INSULIN) GLARGINE 100 UNITS/ML (3ML) SUBQ PEN    Inject 20 Units into the skin once daily.    LANCING DEVICE WITH LANCETS (ACCU-CHEK FASTCLIX LANCING DEV) KIT    Test blood glucose BID    LOSARTAN (COZAAR) 100 MG TABLET    Take 1 tablet (100 mg total) by mouth once daily.    METFORMIN (GLUCOPHAGE-XR) 500 MG ER 24HR TABLET    Take 1 tablet (500 mg total) by mouth 2 (two) times daily.    PANTOPRAZOLE (PROTONIX) 40 MG TABLET    Take 1 tablet (40 mg total) by mouth once daily.     "PEN NEEDLE, DIABETIC (BD ULTRA-FINE MINI PEN NEEDLE) 31 GAUGE X 3/16" NDLE    USE DAILY WITH VICTOZA and LANTUS INJECTIONS    POLYETHYLENE GLYCOL (GLYCOLAX) 17 GRAM/DOSE POWDER    Take 17 g by mouth 2 (two) times a day.    TERAZOSIN (HYTRIN) 2 MG CAPSULE    Take 1 capsule (2 mg total) by mouth every evening.    VICTOZA 2-JASPER 0.6 MG/0.1 ML (18 MG/3 ML) PNIJ PEN    INJECT 1.2MG UNDER SKIN DAILY     Review of patient's allergies indicates:  No Known Allergies  Review of Systems   Constitutional: Negative for decreased appetite.   HENT: Negative for tinnitus.    Eyes: Negative for double vision.   Cardiovascular: Negative for chest pain.   Respiratory: Negative for wheezing.    Hematologic/Lymphatic: Negative for bleeding problem.   Skin: Negative for dry skin.   Musculoskeletal: Positive for back pain, joint pain and stiffness. Negative for arthritis, gout and neck pain.   Gastrointestinal: Negative for abdominal pain.   Genitourinary: Negative for bladder incontinence.   Neurological: Negative for numbness, paresthesias and sensory change.   Psychiatric/Behavioral: Negative for altered mental status.       Objective:   Body mass index is 52.58 kg/m².  There were no vitals filed for this visit.       General    Constitutional: She is oriented to person, place, and time. She appears well-developed.   HENT:   Head: Atraumatic.   Eyes: EOM are normal.   Cardiovascular: Normal rate.    Pulmonary/Chest: Effort normal.   Neurological: She is alert and oriented to person, place, and time.   Psychiatric: Judgment normal.            Cervical rotation is very functional  Left shoulder some tenderness over the acromioclavicular joint.  Flexion 120° abduction 100° with positive impingement sign.  There is some tenderness over the lateral deltoid and slightly posterior.  Negative drop sign.  Elbow full motion and radial ulnar pulses 2+    Lumbar with slight discomfort around L4-L5 paraspinal but no true deformity seen.  Negative " straight leg raising bilaterally  Left hip with pain to palpation over the greater trochanter subsided.  Passive internal external rotation of both hips without pain in the groin.    Right hip full motion with mild right hip tenderness over the greater trochanter     Hip flexors, abductors, adductors, quads, hamstrings, ankle extensors and flexors, inverters and everters all 5/5  Right knee surgical scars healed well.  She has full extension full flexion.  No joint line tenderness on the lateral aspect mild tenderness on the medial joint line, no swelling.  Collaterals and cruciate stable  Left knee with very mild discomfort medially.  Mild crepitus.  Full range of motion.  Collaterals or cruciates are stable.  No swelling.  Calves are soft nontender with mild swelling  Multiple varicosities  Right ankle with  very mild if any swelling lateral malleolus..  No pain to palpation over the lateral malleolus around the ankle.  No tenderness over the medial side .  Minimal discomfort to active extension and flexion over the ankle joint.  Gait /ambulating without any assistive devices much better than last time without much of any limping   DP 1+ and PT 1+  Skin is warm to touch no obvious lesions    Relevant imaging results reviewed and interpreted by me, discussed with the patient and / or family today     X-ray 8/12/21 of the right ankle showing large amount of callus with mortise view very well maintained of the lateral malleolus.  Multiple calcaneal spurs posteriorly and inferiorly no change from before  X-ray 07/15/2021 right ankle with large callus formation over the lateral malleolus fracture.  There is no displacement of the mortise few  X-ray 6/28/21 right ankle with callus formation.  1 mm displacement at the mortise view  Outside CD we could not find anything/could not be downloaded  X-ray 06/11/2021 of the pelvis and left hip joint space very well maintained.  No fracture of the hip.  There is osteophyte or  what looks like an old avulsion of the inferior ischium on the left side at the origin of the hamstrings with  X-ray 06/11/2021 of the right ankle showing 1 mm displaced lateral malleolus fracture at the joint line.  Multiple osteophytes over the calcaneus inferiorly and on insertion of the Achilles tendon.  Mild degenerative changes in the mid foot  X-ray 06/21/2021 of the right ankle showing 1 mm displaced lateral malleolus fracture of the ankle at the joint line.  Basically no change from 06/11/2021 in displacement.  She has does have some callus formation already seen on the x-ray.  The swelling is markedly improved      12/03/2020 x-ray bilateral knees with medial joint mild to moderate narrowing with osteophytic changes on both knees.  No evidence of fracture.      X-ray in the electronic records of both of her knees 08/17/2020 showing some degenerative changes but joint space is fairly maintained bilaterally.  There is some osteophytic changes and some sclerosis  MRI 08/28/2020 with medial meniscus tear, loose bodies, osteophytic changes and some mild chondral thinning  Assessment:     Encounter Diagnoses   Name Primary?    Greater trochanteric bursitis of left hip Yes    Greater trochanteric bursitis of right hip     Closed low lateral malleolus fracture, right, with routine healing, subsequent encounter     S/P right knee arthroscopy     Arthritis of knee, left     Left shoulder tendinitis         Plan:   Greater trochanteric bursitis of left hip    Greater trochanteric bursitis of right hip    Closed low lateral malleolus fracture, right, with routine healing, subsequent encounter    S/P right knee arthroscopy    Arthritis of knee, left    Left shoulder tendinitis    Other orders  -     Large Joint Aspiration/Injection: L subacromial bursa         Patient Instructions   Hemoglobin A1c 1 year ago was around 6.5 and today couple weeks ago it was up to 8.8  You did eat some carbohydrates and sugar E  stuff around the holidays maybe now he should adjust her food and stay away from the rice and the potatoes and the sweets as much as you can  Avoid drinking a lot of soda  Your left shoulder you might have bursitis in it I will give you a low does cortisone injection in it  Ice the shoulder next few days  Next time you come we really need to obtain an x-ray on the shoulder if it still hurting  Continue exercising on your own in doing walks since you could not go to therapy so you do not gain too much weight  Injected subacromial space on the left shoulder with 40 mg Depo-Medrol mixed with 5 cc 1% lidocaine 01/28/2022  I would like you to come to see us within around 8-10 weeks we need x-ray of the left shoulder      Patient has 1 kidney cannot take any anti-inflammatories.  She is diabetic and I went over her hemoglobin A1c and 1 performed this month showing hemoglobin A1c went up to 8.8 compared to 7.2 .  A year ago she was 6.5 much better controlled.  She really admitted that around the holidays she did eat a lot of sugary stuff.  She add a lot of sugar in her coffee and I did tell her she needs to switch her habits maybe put suite in low to minimize the rise in her sugars.  She needs to avoid eating rice and potatoes as much as she can to get her sugars better controlled.  She needs to start losing some weight and she is working on it.  As far as her hips are concerned she could not go to therapy and doing much better as well as her ankle and the right knee.  The left shoulder is the 1 that bothers her and without any history of trauma I did tell her will give her low-dose of steroid injection which might mess up sugar again however she needs to watch what she eats.  There is not much else I can give her for that and she expressed understanding      Disclaimer: This note was prepared using a voice recognition system and is likely to have sound alike errors within the text.

## 2022-04-29 ENCOUNTER — TELEPHONE (OUTPATIENT)
Dept: ORTHOPEDICS | Facility: CLINIC | Age: 62
End: 2022-04-29
Payer: MEDICAID

## 2022-05-10 ENCOUNTER — TELEPHONE (OUTPATIENT)
Dept: ORTHOPEDICS | Facility: CLINIC | Age: 62
End: 2022-05-10
Payer: MEDICAID

## 2022-06-03 PROBLEM — Z90.5 SOLITARY KIDNEY, ACQUIRED: Status: ACTIVE | Noted: 2022-06-03

## 2022-07-21 ENCOUNTER — HOSPITAL ENCOUNTER (OUTPATIENT)
Dept: RADIOLOGY | Facility: HOSPITAL | Age: 62
Discharge: HOME | End: 2022-07-21
Attending: ORTHOPAEDIC SURGERY
Payer: MEDICAID

## 2022-07-21 ENCOUNTER — OFFICE VISIT (OUTPATIENT)
Dept: ORTHOPEDICS | Facility: CLINIC | Age: 62
End: 2022-07-21
Payer: MEDICAID

## 2022-07-21 VITALS — BODY MASS INDEX: 50.61 KG/M2 | HEIGHT: 61 IN | WEIGHT: 268.06 LBS

## 2022-07-21 DIAGNOSIS — M25.571 RIGHT ANKLE PAIN, UNSPECIFIED CHRONICITY: ICD-10-CM

## 2022-07-21 DIAGNOSIS — M70.62 GREATER TROCHANTERIC BURSITIS OF LEFT HIP: Primary | ICD-10-CM

## 2022-07-21 DIAGNOSIS — M75.32 CALCIFIC TENDINITIS OF LEFT SHOULDER: ICD-10-CM

## 2022-07-21 DIAGNOSIS — M17.12 ARTHRITIS OF KNEE, LEFT: ICD-10-CM

## 2022-07-21 DIAGNOSIS — Z98.890 S/P RIGHT KNEE ARTHROSCOPY: ICD-10-CM

## 2022-07-21 DIAGNOSIS — M17.12 ARTHRITIS OF KNEE, LEFT: Primary | ICD-10-CM

## 2022-07-21 DIAGNOSIS — M70.61 GREATER TROCHANTERIC BURSITIS OF RIGHT HIP: ICD-10-CM

## 2022-07-21 DIAGNOSIS — M77.8 LEFT SHOULDER TENDINITIS: ICD-10-CM

## 2022-07-21 DIAGNOSIS — S82.61XD CLOSED LOW LATERAL MALLEOLUS FRACTURE, RIGHT, WITH ROUTINE HEALING, SUBSEQUENT ENCOUNTER: ICD-10-CM

## 2022-07-21 PROCEDURE — 3066F NEPHROPATHY DOC TX: CPT | Mod: CPTII,,, | Performed by: ORTHOPAEDIC SURGERY

## 2022-07-21 PROCEDURE — 3052F PR MOST RECENT HEMOGLOBIN A1C LEVEL 8.0 - < 9.0%: ICD-10-PCS | Mod: CPTII,,, | Performed by: ORTHOPAEDIC SURGERY

## 2022-07-21 PROCEDURE — 20610 LARGE JOINT ASPIRATION/INJECTION: L GREATER TROCHANTERIC BURSA: ICD-10-PCS | Mod: S$PBB,LT,, | Performed by: ORTHOPAEDIC SURGERY

## 2022-07-21 PROCEDURE — 20610 DRAIN/INJ JOINT/BURSA W/O US: CPT | Mod: PBBFAC | Performed by: ORTHOPAEDIC SURGERY

## 2022-07-21 PROCEDURE — 4010F ACE/ARB THERAPY RXD/TAKEN: CPT | Mod: CPTII,,, | Performed by: ORTHOPAEDIC SURGERY

## 2022-07-21 PROCEDURE — 99214 OFFICE O/P EST MOD 30 MIN: CPT | Mod: 25,S$PBB,, | Performed by: ORTHOPAEDIC SURGERY

## 2022-07-21 PROCEDURE — 99999 PR PBB SHADOW E&M-EST. PATIENT-LVL III: ICD-10-PCS | Mod: PBBFAC,,, | Performed by: ORTHOPAEDIC SURGERY

## 2022-07-21 PROCEDURE — 3052F HG A1C>EQUAL 8.0%<EQUAL 9.0%: CPT | Mod: CPTII,,, | Performed by: ORTHOPAEDIC SURGERY

## 2022-07-21 PROCEDURE — 4010F PR ACE/ARB THEARPY RXD/TAKEN: ICD-10-PCS | Mod: CPTII,,, | Performed by: ORTHOPAEDIC SURGERY

## 2022-07-21 PROCEDURE — 3061F PR NEG MICROALBUMINURIA RESULT DOCUMENTED/REVIEW: ICD-10-PCS | Mod: CPTII,,, | Performed by: ORTHOPAEDIC SURGERY

## 2022-07-21 PROCEDURE — 99213 OFFICE O/P EST LOW 20 MIN: CPT | Mod: PBBFAC,25 | Performed by: ORTHOPAEDIC SURGERY

## 2022-07-21 PROCEDURE — 73030 X-RAY EXAM OF SHOULDER: CPT | Mod: 26,LT,, | Performed by: RADIOLOGY

## 2022-07-21 PROCEDURE — 99999 PR PBB SHADOW E&M-EST. PATIENT-LVL III: CPT | Mod: PBBFAC,,, | Performed by: ORTHOPAEDIC SURGERY

## 2022-07-21 PROCEDURE — 3066F PR DOCUMENTATION OF TREATMENT FOR NEPHROPATHY: ICD-10-PCS | Mod: CPTII,,, | Performed by: ORTHOPAEDIC SURGERY

## 2022-07-21 PROCEDURE — 73030 XR SHOULDER COMPLETE 2 OR MORE VIEWS LEFT: ICD-10-PCS | Mod: 26,LT,, | Performed by: RADIOLOGY

## 2022-07-21 PROCEDURE — 3008F PR BODY MASS INDEX (BMI) DOCUMENTED: ICD-10-PCS | Mod: CPTII,,, | Performed by: ORTHOPAEDIC SURGERY

## 2022-07-21 PROCEDURE — 3061F NEG MICROALBUMINURIA REV: CPT | Mod: CPTII,,, | Performed by: ORTHOPAEDIC SURGERY

## 2022-07-21 PROCEDURE — 73030 X-RAY EXAM OF SHOULDER: CPT | Mod: TC,LT

## 2022-07-21 PROCEDURE — 3008F BODY MASS INDEX DOCD: CPT | Mod: CPTII,,, | Performed by: ORTHOPAEDIC SURGERY

## 2022-07-21 PROCEDURE — 1159F MED LIST DOCD IN RCRD: CPT | Mod: CPTII,,, | Performed by: ORTHOPAEDIC SURGERY

## 2022-07-21 PROCEDURE — 1159F PR MEDICATION LIST DOCUMENTED IN MEDICAL RECORD: ICD-10-PCS | Mod: CPTII,,, | Performed by: ORTHOPAEDIC SURGERY

## 2022-07-21 PROCEDURE — 99214 PR OFFICE/OUTPT VISIT, EST, LEVL IV, 30-39 MIN: ICD-10-PCS | Mod: 25,S$PBB,, | Performed by: ORTHOPAEDIC SURGERY

## 2022-07-21 RX ORDER — METHYLPREDNISOLONE ACETATE 80 MG/ML
80 INJECTION, SUSPENSION INTRA-ARTICULAR; INTRALESIONAL; INTRAMUSCULAR; SOFT TISSUE
Status: DISCONTINUED | OUTPATIENT
Start: 2022-07-21 | End: 2022-07-21 | Stop reason: HOSPADM

## 2022-07-21 RX ORDER — PREDNISONE 5 MG/1
5 TABLET ORAL DAILY
Qty: 30 TABLET | Refills: 0 | Status: SHIPPED | OUTPATIENT
Start: 2022-07-21 | End: 2022-12-01

## 2022-07-21 RX ADMIN — METHYLPREDNISOLONE ACETATE 80 MG: 80 INJECTION, SUSPENSION INTRALESIONAL; INTRAMUSCULAR; INTRASYNOVIAL; SOFT TISSUE at 02:07

## 2022-07-21 NOTE — PROCEDURES
Large Joint Aspiration/Injection: L greater trochanteric bursa    Date/Time: 7/21/2022 2:20 PM  Performed by: Braxton Tierney MD  Authorized by: Braxton Tierney MD     Consent Done?:  Yes (Verbal)  Site marked: the procedure site was marked    Timeout: prior to procedure the correct patient, procedure, and site was verified      Local anesthesia used?: Yes    Local anesthetic:  Lidocaine 1% without epinephrine  Ultrasonic Guidance for needle placement?: No    Location:  Hip  Site:  L greater trochanteric bursa  Medications:  80 mg methylPREDNISolone acetate 80 mg/mL  Patient tolerance:  Patient tolerated the procedure well with no immediate complications

## 2022-07-21 NOTE — PROGRESS NOTES
Subjective:     Patient ID: Ramya Swain is a 61 y.o. female.    Chief Complaint: Pain of the Left Shoulder, Pain of the Left Hip, and Pain of the Right Knee   09/25/2020   left hip pain  HPI:  60-year-old been having pain for 2-3 years now.  Cannot remember any specific trauma.  She occasionally does have back pain.  Treatment included physical therapy at [facility].  Cannot take NSAIDs due to having 1 kidney and being diabetic.  Claims cannot sleep on the left side a bothers her.  She does have occasional low back pain.  She does take Tylenol but does not help.  Her workup included x-rays and MRI of her right knee.  Pain is 7/10.  She claims she has catching locking feeling unable to kneel unable to do stairs as well.  She feels occasionally about to fall catches herself because of the right knee.  Pain is episodic.  She is ambulating without any assistive devices.  Denies any fever or chills or shortness of breath or difficulty with chewing or swallowing loss of bowel bladder control or blurry vision double vision or loss of sense of smell or taste    11/09/2020  Date of surgery 10/02/2020 right knee arthroscopy partial medial meniscectomy removal of loose body with finding of chondromalacia also left hip injection.  Patient stated she is not having any hip in pain or any knee pain is very pleased with the results ambulating without any assistive devices.  Pain level 0/10 to both the right knee and left hip.  Very pleased with her results  Denies any fever or chills or shortness of breath or difficulty with chewing or swallowing loss of bowel bladder control or blurry vision double vision loss of sense of smell or taste or numbness or tingling in the legs       12/03/2020  Right knee arthroscopy 10/02/2020 with partial medial meniscectomy and removal of loose body and chondromalacia as well as injection of hip/hip is doing well.  Patient was doing really well until she tripped and fell on the right knee.   Date of injury 11/25/2020.  She wants to make sure nothing is fractured.  Her pain is 4/10 and not so bad and able to function well.  Cannot take any NSAIDs due to chronic kidney disease and being diabetic.  She tries to take only Tylenol.  Denies any fever or chills or shortness of breath or difficulty with chewing or swallowing loss of bowel bladder control blurry vision or double vision or loss of sense smell or taste      06/11/2021  Chief complaint severe right ankle pain, left hip pain.  Patient stated she fell on May 29 in a picnic.  Presents to her primary care they did x-ray.  She showed up with a CD.  She is hurting severe and with ankle swollen.  She is ambulating without any assistive devices.  There was no splinting or anything on the ankle.  She does take gabapentin.  Previously was on tramadol that did not seem to help.  I did scope her knee years ago and that knee is doing okay.  Occasional back pain occasionally and can not sleep on that left hip and sometimes cannot sit on the left hip.  No fever no chills no shortness of breath or difficulty with chewing or swallowing loss of bowel bladder control blurry vision double vision loss sense smell taste    06/21/2021  Right ankle lateral malleolus fracture with 1 mm displacement at the joint line.  Opted to observe this on a weekly basis.  She was placed in clamshell type of a boot and allowed to weight bear as tolerated.  I did tell her if I see displacement on the fracture to on acceptable alignment she will need ORIF with plates and screws.  We gave her Norco last time and that seems to have eased up things her pain is still there at 7/10 but not as bad as it was last visit.  As far as her left hip is concerned that was injected last visit and is markedly improved.  She is walking with the cam walker boot.  She states that she is wearing it 24 hours like I instructed her last time only take it out to wash in put her socks on  Right knee is doing a  little bit better the ankle is well bothers her the most at this time   No fever no chills no shortness of breath difficulty with chewing or swallowing loss of bowel bladder control blurry vision double vision or loss of sense smell or taste    06/28/2021 right ankle lateral malleolus with 1 mm displacement at the mortise.  We treating her non operatively in following her on a weekly basis to make sure it has not moved.  Last week there was callus formation on the x-ray and the pain has slowly getting better.  As far as other problems she has slight bursitis of her hip that is doing much better at this time also she has early degeneration of her knee status post arthroscopic surgery and that is not bother her as much as her ankle at this time.    No fever no chills no shortness of breath no difficulty with chewing or swallowing or loss of bowel bladder control blurry vision double vision loss sense smell or taste    Patient complain today of slight discomfort over the right greater trochanter which is new.  No pain in the groin.  Pain is still 7/10.  Date of injury of the right ankle May 29, 2021      07/15/2021  Right ankle lateral malleolus fracture.  Date of injury 05/29/2021  Pain in the ankle is doing much better at 3/10.  The left hip started to hurt more we did injected on 06/11/2021.  As far as the knees in concerned a little and tender.  She is very pleased that we taking care of her ankle fracture.  Ambulating with a cam walker boot.  Not wearing it at night at this time.  No fever no chills or shortness of breath or difficulty with chewing or swallowing loss of bowel bladder control blurry vision double vision loss sense smell or taste    08/12/21  Right ankle lateral malleolus fracture 05/29/2021.  We opted to treat her non operatively since the mortise was maintained and less than 2 mm of displacement.  She is forming quite a bit of callus formation.  Pain is improving however swelling is there.   Occasional pain at 5/10.  What concerns her the most is the left hip she cannot sleep on that side and occasionally the right.  The left knee does not bother.  She does have bursitis of her hips and reinjected it on the left in 06/11/2021.  Also she has arthritis in both of her knees with medial joint narrowing and already had an arthroscopic surgery on the right knee.  The knee pain is tolerable at this time.  Ambulating without any assistive devices.    No fever no chills no shortness of breath no difficulty with chewing or swallowing or loss of bowel bladder control but her vision or double vision or chest pain or calf pain.    11/12/2021  Bilateral hip pains.  His last visit 08/12/2021 was given injection into the left hip of steroid without much success.  She still having both hips hurting her with the left worse than right unable to sleep on both sides.  She has chronic kidney disease and diabetes but cannot give her any NSAIDs.  She is ambulating without any assistive devices.  As far as the right ankle lateral malleolus fracture is not bothering her at this time and not wearing any braces.  No fever no chills no shortness of breath or difficulty with chewing or swallowing loss of bladder control    01/28/2022  Left shoulder pain  Patient stated no trauma over the last 3- 4 days started with the shoulder pain.  Unable to sleep on it.  Difficulty with raising it above her shoulder level.  I did review her hemoglobin A1c and there was an increase up to 8.8 from 7.3.  Around the holidays she was eating a lot a carbohydrates and sweets.  I did tell her that she needs to control her sugars better.  Last time she was here because she has only 1 kidney I placed her on prednisone low does to help out with the hips and send her to therapy.  Her hips are doing really well the prednisone messed up a little bit her sugar but not so bad.  She could not go to physical therapy due to transportation issues.  She just started  doing some exercises a walking on her own.  Pain around 6/10 on the shoulder and 2/10 on the hips in 0/10 on the ankle on the right and in right knee.  No numbness no tingling in her hands or feet      07/21/2022  Left hip severe pain/trochanteric bursitis.  Left shoulder still hurting as well as the right knee and however the right fractured ankle is doing okay.  She has 1 kidney and diabetic cannot have any NSAIDs.  Only show is for her is steroid in the mess up her sugars.  I did go over hemoglobin A1c in the chart and she cannot get it below 8. She stated she stop eating the rice and the potatoes however she expressed that she drinks a lot of cranberry juice and I said mostly old uses have sugar in it.  She needs to drain just water and may be Gatorade without sugar in it.  Her hip is hurting her more than the shoulder as well as now she is having pain in the right knee.  Pain is 7/10.  He  Past Medical History:   Diagnosis Date    Anxiety     Arthritis     CKD (chronic kidney disease), stage III     Congenital single kidney     Diabetes mellitus, type 2     GERD (gastroesophageal reflux disease)     Hyperlipidemia     Hypertension     Solitary kidney, acquired 6/3/2022     Past Surgical History:   Procedure Laterality Date    ARTHROSCOPY OF KNEE Right 10/2/2020    Procedure: ARTHROSCOPY, KNEE;  Surgeon: Braxton Tierney MD;  Location: [facility] OR;  Service: Orthopedics;  Laterality: Right;  left greater trochanteric injection    BILATERAL OOPHORECTOMY      EXCISION OF MEDIAL MENISCUS OF KNEE Right 10/2/2020    Procedure: MENISCECTOMY, KNEE, MEDIAL;  Surgeon: Braxton Tierney MD;  Location: [facility] OR;  Service: Orthopedics;  Laterality: Right;  Partial medial meniscectomy    HYSTERECTOMY      INJECTION OF JOINT Left 10/2/2020    Procedure: INJECTION, JOINT, SHOULDER, HIP, OR KNEE;  Surgeon: Braxton Teirney MD;  Location: [facility] OR;  Service: Orthopedics;  Laterality: Left;    SYNOVECTOMY  Right 10/2/2020    Procedure: SYNOVECTOMY;  Surgeon: Braxton Tierney MD;  Location: [facility];  Service: Orthopedics;  Laterality: Right;    TUBAL LIGATION       Family History   Problem Relation Age of Onset    Hypertension Mother     Diabetes Mother     Stroke Father      Social History     Socioeconomic History    Marital status: Single   Tobacco Use    Smoking status: Never Smoker    Smokeless tobacco: Never Used   Substance and Sexual Activity    Alcohol use: Never    Drug use: Never    Sexual activity: Not Currently     Partners: Male     Medication List with Changes/Refills   New Medications    PREDNISONE (DELTASONE) 5 MG TABLET    Take 1 tablet (5 mg total) by mouth once daily.   Current Medications    ACYCLOVIR (ZOVIRAX) 800 MG TAB    TAKE 1 TABLET BY MOUTH EVERY DAY    ALBUTEROL (PROVENTIL) 2.5 MG /3 ML (0.083 %) NEBULIZER SOLUTION    Take 3 mLs (2.5 mg total) by nebulization every 4 to 6 hours as needed for Wheezing. Rescue    ALBUTEROL (PROVENTIL/VENTOLIN HFA) 90 MCG/ACTUATION INHALER    TAKE 2 PUFFS BY MOUTH EVERY 6 HOURS AS NEEDED FOR WHEEZE    ALPRAZOLAM (XANAX) 1 MG TABLET    TAKE 1 TABLET BY MOUTH DAILY AS NEEDED FOR ANXIETY    AMLODIPINE (NORVASC) 10 MG TABLET    TAKE 1 TABLET BY MOUTH EVERY DAY    ATORVASTATIN (LIPITOR) 40 MG TABLET    Take 1 tablet (40 mg total) by mouth once daily.    BLOOD SUGAR DIAGNOSTIC (ACCU-CHEK LUIS PLUS TEST STRP) STRP    TEST BLOOD GLUCOSE TWICE A DAY    BLOOD-GLUCOSE METER (ACCU-CHEK LUIS PLUS METER) MISC    Use with test strips for home glucose monitoring daily    CARVEDILOL (COREG) 25 MG TABLET    Take 1 tablet (25 mg total) by mouth 2 (two) times daily.    CETIRIZINE (ZYRTEC) 10 MG TABLET    TAKE 1 TABLET BY MOUTH EVERY DAY    CLONIDINE (CATAPRES) 0.2 MG TABLET    TAKE 1 TABLET BY MOUTH 2 TIMES DAILY.    CYCLOBENZAPRINE (FLEXERIL) 10 MG TABLET    Take 1 tablet (10 mg total) by mouth 3 (three) times daily as needed for Muscle spasms.    FLUCONAZOLE  "(DIFLUCAN) 150 MG TAB    1 po Q week x 2 weeks    FLUTICASONE PROPIONATE (FLONASE) 50 MCG/ACTUATION NASAL SPRAY    2 sprays by Each Nostril route daily as needed.     FUROSEMIDE (LASIX) 40 MG TABLET    TAKE 1 TABLET BY MOUTH EVERY DAY AS NEEDED    GABAPENTIN (NEURONTIN) 300 MG CAPSULE    TAKE 1 CAPSULE BY MOUTH THREE TIMES A DAY    HYDROCHLOROTHIAZIDE (HYDRODIURIL) 25 MG TABLET    Take 1 tablet (25 mg total) by mouth once daily.    INSULIN (LANTUS SOLOSTAR U-100 INSULIN) GLARGINE 100 UNITS/ML (3ML) SUBQ PEN    Inject 20 Units into the skin once daily.    LANCING DEVICE WITH LANCETS (ACCU-CHEK FASTCLIX LANCING DEV) KIT    Test blood glucose BID    LIRAGLUTIDE 0.6 MG/0.1 ML, 18 MG/3 ML, SUBQ PNIJ (VICTOZA 2-JASPER) 0.6 MG/0.1 ML (18 MG/3 ML) PNIJ PEN    Inject 1.8mg daily    LOSARTAN (COZAAR) 100 MG TABLET    Take 1 tablet (100 mg total) by mouth once daily.    METFORMIN (GLUCOPHAGE-XR) 500 MG ER 24HR TABLET    Take 1 tablet (500 mg total) by mouth 2 (two) times daily.    METHOCARBAMOL (ROBAXIN) 750 MG TAB    Take 1 tablet (750 mg total) by mouth 3 (three) times daily as needed (back pain).    MUPIROCIN (BACTROBAN) 2 % OINTMENT    Apply topically 2 (two) times daily.    PANTOPRAZOLE (PROTONIX) 40 MG TABLET    Take 1 tablet (40 mg total) by mouth once daily.    PEN NEEDLE, DIABETIC (BD ULTRA-FINE MINI PEN NEEDLE) 31 GAUGE X 3/16" NDLE    USE DAILY WITH VICTOZA and LANTUS INJECTIONS    POLYETHYLENE GLYCOL (GLYCOLAX) 17 GRAM/DOSE POWDER    Take 17 g by mouth 2 (two) times a day.    TERAZOSIN (HYTRIN) 2 MG CAPSULE    Take 1 capsule (2 mg total) by mouth every evening.    TRIAMCINOLONE ACETONIDE 0.1% (KENALOG) 0.1 % CREAM    Apply topically every evening.     Review of patient's allergies indicates:  No Known Allergies  Review of Systems   Constitutional: Negative for decreased appetite.   HENT: Negative for tinnitus.    Eyes: Negative for double vision.   Cardiovascular: Negative for chest pain.   Respiratory: Negative for " wheezing.    Hematologic/Lymphatic: Negative for bleeding problem.   Skin: Negative for dry skin.   Musculoskeletal: Positive for back pain, joint pain and stiffness. Negative for arthritis, gout and neck pain.   Gastrointestinal: Negative for abdominal pain.   Genitourinary: Negative for bladder incontinence.   Neurological: Negative for numbness, paresthesias and sensory change.   Psychiatric/Behavioral: Negative for altered mental status.       Objective:   Body mass index is 50.65 kg/m².  There were no vitals filed for this visit.       General    Constitutional: She is oriented to person, place, and time. She appears well-developed.   HENT:   Head: Atraumatic.   Eyes: EOM are normal.   Cardiovascular: Normal rate.    Pulmonary/Chest: Effort normal.   Neurological: She is alert and oriented to person, place, and time.   Psychiatric: Judgment normal.            Cervical rotation is very functional  Left shoulder some tenderness over the acromioclavicular joint.  Flexion 110° abduction 100° with positive impingement sign.  There is some tenderness over the lateral deltoid and slightly posterior.  Negative drop sign.  Elbow full motion and radial ulnar pulses 2+    Lumbar with slight discomfort around L4-L5 paraspinal but no true deformity seen.  Negative straight leg raising bilaterally  Left hip with pain to palpation over the greater trochanter subsided.  Passive internal external rotation of both hips without pain in the groin.    Right hip full motion with mild right hip tenderness over the greater trochanter     Hip flexors, abductors, adductors, quads, hamstrings, ankle extensors and flexors, inverters and everters all 5/5  Right knee surgical scars healed well.  She has full extension full flexion.  No joint line tenderness on the lateral aspect mild tenderness on the medial joint line, no swelling.  It feels like she has a plica with flexion and extension that snaps over the condyle on the lateral side.   Collaterals and cruciate stable  Left knee with very mild discomfort medially.  Mild crepitus.  Full range of motion.  Collaterals or cruciates are stable.  No swelling.  Calves are soft nontender with mild swelling  Multiple varicosities  Right ankle with  very mild if any swelling lateral malleolus..  No pain to palpation over the lateral malleolus around the ankle.  No tenderness over the medial side .  Minimal discomfort to active extension and flexion over the ankle joint.  Gait /ambulating without any assistive devices much better than last time without much of any limping   DP 1+ and PT 1+  Skin is warm to touch no obvious lesions    Relevant imaging results reviewed and interpreted by me, discussed with the patient and / or family today   X-ray left shoulder 07/21/2022 showing mild degeneration over the acromioclavicular joint.  This calcific deposit over the supraspinatus muscle over the insertion on the humeral head consistent with calcific tendinitis.  Joint space seems to be well maintained/glenohumeral.  No fracture seen  X-ray 8/12/21 of the right ankle showing large amount of callus with mortise view very well maintained of the lateral malleolus.  Multiple calcaneal spurs posteriorly and inferiorly no change from before  X-ray 07/15/2021 right ankle with large callus formation over the lateral malleolus fracture.  There is no displacement of the mortise few  X-ray 6/28/21 right ankle with callus formation.  1 mm displacement at the mortise view  Outside CD we could not find anything/could not be downloaded  X-ray 06/11/2021 of the pelvis and left hip joint space very well maintained.  No fracture of the hip.  There is osteophyte or what looks like an old avulsion of the inferior ischium on the left side at the origin of the hamstrings with  X-ray 06/11/2021 of the right ankle showing 1 mm displaced lateral malleolus fracture at the joint line.  Multiple osteophytes over the calcaneus inferiorly and on  insertion of the Achilles tendon.  Mild degenerative changes in the mid foot  X-ray 06/21/2021 of the right ankle showing 1 mm displaced lateral malleolus fracture of the ankle at the joint line.  Basically no change from 06/11/2021 in displacement.  She has does have some callus formation already seen on the x-ray.  The swelling is markedly improved      12/03/2020 x-ray bilateral knees with medial joint mild to moderate narrowing with osteophytic changes on both knees.  No evidence of fracture.      X-ray in the electronic records of both of her knees 08/17/2020 showing some degenerative changes but joint space is fairly maintained bilaterally.  There is some osteophytic changes and some sclerosis  MRI 08/28/2020 with medial meniscus tear, loose bodies, osteophytic changes and some mild chondral thinning  Assessment:     Encounter Diagnoses   Name Primary?    Greater trochanteric bursitis of left hip Yes    Greater trochanteric bursitis of right hip     Closed low lateral malleolus fracture, right, with routine healing, subsequent encounter     S/P right knee arthroscopy     Arthritis of knee, left     Calcific tendinitis of left shoulder         Plan:   Greater trochanteric bursitis of left hip  -     predniSONE (DELTASONE) 5 MG tablet; Take 1 tablet (5 mg total) by mouth once daily.  Dispense: 30 tablet; Refill: 0  -     Large Joint Aspiration/Injection: L greater trochanteric bursa    Greater trochanteric bursitis of right hip  -     predniSONE (DELTASONE) 5 MG tablet; Take 1 tablet (5 mg total) by mouth once daily.  Dispense: 30 tablet; Refill: 0    Closed low lateral malleolus fracture, right, with routine healing, subsequent encounter    S/P right knee arthroscopy    Arthritis of knee, left  -     predniSONE (DELTASONE) 5 MG tablet; Take 1 tablet (5 mg total) by mouth once daily.  Dispense: 30 tablet; Refill: 0    Calcific tendinitis of left shoulder  -     predniSONE (DELTASONE) 5 MG tablet; Take 1  tablet (5 mg total) by mouth once daily.  Dispense: 30 tablet; Refill: 0         Patient Instructions   You may start using Aspercreme arthritis or Aspercreme with lidocaine apply couple inches over the areas that hurt twice or 3 times a day  You have 1 kidney I cannot give you anti-inflammatory by mouth but Aspercreme topical only 5% of it gets absorbed  You need to take Tylenol Arthritis 3 times a day  Your hemoglobin A1c is in the 8 that means is a little bit elevated  Will give you a knee injection into the left hip since that is the 1 that hurts her the most of 80 mg Depo-Medrol mixed with 5 cc 1% lidocaine the cane  In the future we may have to put you on prednisone low-dose 5 mg for a couple weeks even though in might mess up her sugar little bit and make you hungry  I will give you the injection today I want you to wait a week before you start the pills   Avoid drinking juices because they are all full of sugars maybe that is why your sugar levels elevated  You can drink they Gatorade without sugar and water  Next visit x-ray the right and left knee as well as the right ankle      Patient has 1 kidney cannot take any anti-inflammatories.  She is diabetic and I went over her hemoglobin A1c and 1 performed this month showing hemoglobin A1c went up to 8.8 compared to 7.2 .  A year ago she was 6.5 much better controlled.  She really admitted that around the holidays she did eat a lot of sugary stuff.  She add a lot of sugar in her coffee and I did tell her she needs to switch her habits maybe put suite in low to minimize the rise in her sugars.  She needs to avoid eating rice and potatoes as much as she can to get her sugars better controlled.  She needs to start losing some weight and she is working on it.  She stated she stop taking the potatoes rice however she drinks fruit juice and I did tell her that is full of sugars to that is why her hemoglobin A1c is not dropping below 8.     As far as her hips are  concerned she could not go to therapy and doing much better as well as her ankle and the right knee.  The left shoulder is the 1 that bothers her and without any history of trauma I did tell her will give her low-dose of steroid injection which might mess up sugar again however she needs to watch what she eats.  There is not much else I can give her for that and she expressed understanding  She cannot take NSAIDs she has 1 kidney and diabetic  Prednisone and steroid are her only treatment options at this time.  07/21/2022 injected the left hip and will hold off on injecting the shoulder and start prednisone 5 mg a week after the injection  Long time spent with the patient going over all of the above  She would need further x-rays next visit      Disclaimer: This note was prepared using a voice recognition system and is likely to have sound alike errors within the text.

## 2022-07-21 NOTE — PATIENT INSTRUCTIONS
You may start using Aspercreme arthritis or Aspercreme with lidocaine apply couple inches over the areas that hurt twice or 3 times a day  You have 1 kidney I cannot give you anti-inflammatory by mouth but Aspercreme topical only 5% of it gets absorbed  You need to take Tylenol Arthritis 3 times a day  Your hemoglobin A1c is in the 8 that means is a little bit elevated  Will give you a knee injection into the left hip since that is the 1 that hurts her the most of 80 mg Depo-Medrol mixed with 5 cc 1% lidocaine the cane  In the future we may have to put you on prednisone low-dose 5 mg for a couple weeks even though in might mess up her sugar little bit and make you hungry  I will give you the injection today I want you to wait a week before you start the pills   Avoid drinking juices because they are all full of sugars maybe that is why your sugar levels elevated  You can drink they Gatorade without sugar and water  Next visit x-ray the right and left knee as well as the right ankle

## 2022-10-20 ENCOUNTER — HOSPITAL ENCOUNTER (OUTPATIENT)
Dept: RADIOLOGY | Facility: HOSPITAL | Age: 62
Discharge: HOME | End: 2022-10-20
Attending: ORTHOPAEDIC SURGERY
Payer: MEDICAID

## 2022-10-20 ENCOUNTER — OFFICE VISIT (OUTPATIENT)
Dept: ORTHOPEDICS | Facility: CLINIC | Age: 62
End: 2022-10-20
Payer: MEDICAID

## 2022-10-20 VITALS — BODY MASS INDEX: 50.36 KG/M2 | WEIGHT: 266.75 LBS | HEIGHT: 61 IN

## 2022-10-20 DIAGNOSIS — M70.62 GREATER TROCHANTERIC BURSITIS OF LEFT HIP: Primary | ICD-10-CM

## 2022-10-20 DIAGNOSIS — M17.11 ARTHRITIS OF KNEE, RIGHT: ICD-10-CM

## 2022-10-20 DIAGNOSIS — M70.61 GREATER TROCHANTERIC BURSITIS OF RIGHT HIP: ICD-10-CM

## 2022-10-20 DIAGNOSIS — M25.571 RIGHT ANKLE PAIN, UNSPECIFIED CHRONICITY: ICD-10-CM

## 2022-10-20 DIAGNOSIS — M75.32 CALCIFIC TENDINITIS OF LEFT SHOULDER: ICD-10-CM

## 2022-10-20 DIAGNOSIS — S82.61XD CLOSED LOW LATERAL MALLEOLUS FRACTURE, RIGHT, WITH ROUTINE HEALING, SUBSEQUENT ENCOUNTER: ICD-10-CM

## 2022-10-20 DIAGNOSIS — M17.12 ARTHRITIS OF KNEE, LEFT: ICD-10-CM

## 2022-10-20 DIAGNOSIS — M17.11 PRIMARY OSTEOARTHRITIS OF RIGHT KNEE: Primary | ICD-10-CM

## 2022-10-20 DIAGNOSIS — Z98.890 S/P RIGHT KNEE ARTHROSCOPY: ICD-10-CM

## 2022-10-20 PROCEDURE — 3066F NEPHROPATHY DOC TX: CPT | Mod: CPTII,,, | Performed by: ORTHOPAEDIC SURGERY

## 2022-10-20 PROCEDURE — 4010F PR ACE/ARB THEARPY RXD/TAKEN: ICD-10-PCS | Mod: CPTII,,, | Performed by: ORTHOPAEDIC SURGERY

## 2022-10-20 PROCEDURE — 99214 PR OFFICE/OUTPT VISIT, EST, LEVL IV, 30-39 MIN: ICD-10-PCS | Mod: 25,S$PBB,, | Performed by: ORTHOPAEDIC SURGERY

## 2022-10-20 PROCEDURE — 20610 DRAIN/INJ JOINT/BURSA W/O US: CPT | Mod: PBBFAC,LT | Performed by: ORTHOPAEDIC SURGERY

## 2022-10-20 PROCEDURE — 3061F NEG MICROALBUMINURIA REV: CPT | Mod: CPTII,,, | Performed by: ORTHOPAEDIC SURGERY

## 2022-10-20 PROCEDURE — 1159F PR MEDICATION LIST DOCUMENTED IN MEDICAL RECORD: ICD-10-PCS | Mod: CPTII,,, | Performed by: ORTHOPAEDIC SURGERY

## 2022-10-20 PROCEDURE — 3051F PR MOST RECENT HEMOGLOBIN A1C LEVEL 7.0 - < 8.0%: ICD-10-PCS | Mod: CPTII,,, | Performed by: ORTHOPAEDIC SURGERY

## 2022-10-20 PROCEDURE — 73564 XR KNEE ORTHO BILAT WITH FLEXION: ICD-10-PCS | Mod: 26,50,, | Performed by: RADIOLOGY

## 2022-10-20 PROCEDURE — 99214 OFFICE O/P EST MOD 30 MIN: CPT | Mod: PBBFAC,25 | Performed by: ORTHOPAEDIC SURGERY

## 2022-10-20 PROCEDURE — 73610 X-RAY EXAM OF ANKLE: CPT | Mod: 26,RT,, | Performed by: RADIOLOGY

## 2022-10-20 PROCEDURE — 73564 X-RAY EXAM KNEE 4 OR MORE: CPT | Mod: TC,50

## 2022-10-20 PROCEDURE — 3051F HG A1C>EQUAL 7.0%<8.0%: CPT | Mod: CPTII,,, | Performed by: ORTHOPAEDIC SURGERY

## 2022-10-20 PROCEDURE — 3066F PR DOCUMENTATION OF TREATMENT FOR NEPHROPATHY: ICD-10-PCS | Mod: CPTII,,, | Performed by: ORTHOPAEDIC SURGERY

## 2022-10-20 PROCEDURE — 73610 XR ANKLE COMPLETE 3 VIEW RIGHT: ICD-10-PCS | Mod: 26,RT,, | Performed by: RADIOLOGY

## 2022-10-20 PROCEDURE — 1159F MED LIST DOCD IN RCRD: CPT | Mod: CPTII,,, | Performed by: ORTHOPAEDIC SURGERY

## 2022-10-20 PROCEDURE — 99999 PR PBB SHADOW E&M-EST. PATIENT-LVL IV: ICD-10-PCS | Mod: PBBFAC,,, | Performed by: ORTHOPAEDIC SURGERY

## 2022-10-20 PROCEDURE — 1160F PR REVIEW ALL MEDS BY PRESCRIBER/CLIN PHARMACIST DOCUMENTED: ICD-10-PCS | Mod: CPTII,,, | Performed by: ORTHOPAEDIC SURGERY

## 2022-10-20 PROCEDURE — 3061F PR NEG MICROALBUMINURIA RESULT DOCUMENTED/REVIEW: ICD-10-PCS | Mod: CPTII,,, | Performed by: ORTHOPAEDIC SURGERY

## 2022-10-20 PROCEDURE — 20610 LARGE JOINT ASPIRATION/INJECTION: L GREATER TROCHANTERIC BURSA: ICD-10-PCS | Mod: S$PBB,LT,, | Performed by: ORTHOPAEDIC SURGERY

## 2022-10-20 PROCEDURE — 73564 X-RAY EXAM KNEE 4 OR MORE: CPT | Mod: 26,50,, | Performed by: RADIOLOGY

## 2022-10-20 PROCEDURE — 1160F RVW MEDS BY RX/DR IN RCRD: CPT | Mod: CPTII,,, | Performed by: ORTHOPAEDIC SURGERY

## 2022-10-20 PROCEDURE — 4010F ACE/ARB THERAPY RXD/TAKEN: CPT | Mod: CPTII,,, | Performed by: ORTHOPAEDIC SURGERY

## 2022-10-20 PROCEDURE — 99214 OFFICE O/P EST MOD 30 MIN: CPT | Mod: 25,S$PBB,, | Performed by: ORTHOPAEDIC SURGERY

## 2022-10-20 PROCEDURE — 99999 PR PBB SHADOW E&M-EST. PATIENT-LVL IV: CPT | Mod: PBBFAC,,, | Performed by: ORTHOPAEDIC SURGERY

## 2022-10-20 PROCEDURE — 73610 X-RAY EXAM OF ANKLE: CPT | Mod: TC,RT

## 2022-10-20 RX ORDER — METHYLPREDNISOLONE ACETATE 80 MG/ML
80 INJECTION, SUSPENSION INTRA-ARTICULAR; INTRALESIONAL; INTRAMUSCULAR; SOFT TISSUE
Status: DISCONTINUED | OUTPATIENT
Start: 2022-10-20 | End: 2022-10-20 | Stop reason: HOSPADM

## 2022-10-20 RX ADMIN — METHYLPREDNISOLONE ACETATE 80 MG: 80 INJECTION, SUSPENSION INTRALESIONAL; INTRAMUSCULAR; INTRASYNOVIAL; SOFT TISSUE at 11:10

## 2022-10-20 NOTE — PATIENT INSTRUCTIONS
You having severe trochanteric bursitis on the left hip and the injection helped for several weeks  You stated the prednisone I gave you for a month seems to help but might mess up her sugar and you cannot be on it forever   The right knee which we scoped few years back now it is hurting you on the inside   X-ray of both of her knees showing early arthritis  X-ray of your right ankle showing the fracture healed but you have bone spurs in the heel  Because her diabetic we cannot keep on giving you too much cortisone I will inject the left hip today since that what hurts her the most  Will get the insurance to see if they will approve you to have gel injection into the right knee it is made from [employer] /other name [employer] hyaluronic acid injection

## 2022-10-20 NOTE — PROCEDURES
Large Joint Aspiration/Injection: L greater trochanteric bursa    Date/Time: 10/20/2022 11:00 AM  Performed by: Braxton Tierney MD  Authorized by: Braxton Tierney MD     Consent Done?:  Yes (Verbal)  Site marked: the procedure site was marked    Timeout: prior to procedure the correct patient, procedure, and site was verified      Local anesthesia used?: Yes    Local anesthetic:  Lidocaine 1% without epinephrine  Ultrasonic Guidance for needle placement?: No    Approach:  Lateral  Location:  Hip  Site:  L greater trochanteric bursa  Medications:  80 mg methylPREDNISolone acetate 80 mg/mL  Patient tolerance:  Patient tolerated the procedure well with no immediate complications

## 2022-10-20 NOTE — PROGRESS NOTES
Subjective:     Patient ID: Ramya Swain is a 62 y.o. female.    Chief Complaint: Pain of the Left Hip   09/25/2020   left hip pain  HPI:  60-year-old been having pain for 2-3 years now.  Cannot remember any specific trauma.  She occasionally does have back pain.  Treatment included physical therapy at [facility] [place].  Cannot take NSAIDs due to having 1 kidney and being diabetic.  Claims cannot sleep on the left side a bothers her.  She does have occasional low back pain.  She does take Tylenol but does not help.  Her workup included x-rays and MRI of her right knee.  Pain is 7/10.  She claims she has catching locking feeling unable to kneel unable to do stairs as well.  She feels occasionally about to fall catches herself because of the right knee.  Pain is episodic.  She is ambulating without any assistive devices.  Denies any fever or chills or shortness of breath or difficulty with chewing or swallowing loss of bowel bladder control or blurry vision double vision or loss of sense of smell or taste    11/09/2020  Date of surgery 10/02/2020 right knee arthroscopy partial medial meniscectomy removal of loose body with finding of chondromalacia also left hip injection.  Patient stated she is not having any hip in pain or any knee pain is very pleased with the results ambulating without any assistive devices.  Pain level 0/10 to both the right knee and left hip.  Very pleased with her results  Denies any fever or chills or shortness of breath or difficulty with chewing or swallowing loss of bowel bladder control or blurry vision double vision loss of sense of smell or taste or numbness or tingling in the legs       12/03/2020  Right knee arthroscopy 10/02/2020 with partial medial meniscectomy and removal of loose body and chondromalacia as well as injection of hip/hip is doing well.  Patient was doing really well until she tripped and fell on the right knee.  Date of injury 11/25/2020.  She wants to make sure  nothing is fractured.  Her pain is 4/10 and not so bad and able to function well.  Cannot take any NSAIDs due to chronic kidney disease and being diabetic.  She tries to take only Tylenol.  Denies any fever or chills or shortness of breath or difficulty with chewing or swallowing loss of bowel bladder control blurry vision or double vision or loss of sense smell or taste      06/11/2021  Chief complaint severe right ankle pain, left hip pain.  Patient stated she fell on May 29 in a picnic.  Presents to her primary care they did x-ray.  She showed up with a CD.  She is hurting severe and with ankle swollen.  She is ambulating without any assistive devices.  There was no splinting or anything on the ankle.  She does take gabapentin.  Previously was on tramadol that did not seem to help.  I did scope her knee years ago and that knee is doing okay.  Occasional back pain occasionally and can not sleep on that left hip and sometimes cannot sit on the left hip.  No fever no chills no shortness of breath or difficulty with chewing or swallowing loss of bowel bladder control blurry vision double vision loss sense smell taste    06/21/2021  Right ankle lateral malleolus fracture with 1 mm displacement at the joint line.  Opted to observe this on a weekly basis.  She was placed in clamshell type of a boot and allowed to weight bear as tolerated.  I did tell her if I see displacement on the fracture to on acceptable alignment she will need ORIF with plates and screws.  We gave her Norco last time and that seems to have eased up things her pain is still there at 7/10 but not as bad as it was last visit.  As far as her left hip is concerned that was injected last visit and is markedly improved.  She is walking with the cam walker boot.  She states that she is wearing it 24 hours like I instructed her last time only take it out to wash in put her socks on  Right knee is doing a little bit better the ankle is well bothers her the  most at this time   No fever no chills no shortness of breath difficulty with chewing or swallowing loss of bowel bladder control blurry vision double vision or loss of sense smell or taste    06/28/2021 right ankle lateral malleolus with 1 mm displacement at the mortise.  We treating her non operatively in following her on a weekly basis to make sure it has not moved.  Last week there was callus formation on the x-ray and the pain has slowly getting better.  As far as other problems she has slight bursitis of her hip that is doing much better at this time also she has early degeneration of her knee status post arthroscopic surgery and that is not bother her as much as her ankle at this time.    No fever no chills no shortness of breath no difficulty with chewing or swallowing or loss of bowel bladder control blurry vision double vision loss sense smell or taste    Patient complain today of slight discomfort over the right greater trochanter which is new.  No pain in the groin.  Pain is still 7/10.  Date of injury of the right ankle May 29, 2021      07/15/2021  Right ankle lateral malleolus fracture.  Date of injury 05/29/2021  Pain in the ankle is doing much better at 3/10.  The left hip started to hurt more we did injected on 06/11/2021.  As far as the knees in concerned a little and tender.  She is very pleased that we taking care of her ankle fracture.  Ambulating with a cam walker boot.  Not wearing it at night at this time.  No fever no chills or shortness of breath or difficulty with chewing or swallowing loss of bowel bladder control blurry vision double vision loss sense smell or taste    08/12/21  Right ankle lateral malleolus fracture 05/29/2021.  We opted to treat her non operatively since the mortise was maintained and less than 2 mm of displacement.  She is forming quite a bit of callus formation.  Pain is improving however swelling is there.  Occasional pain at 5/10.  What concerns her the most is  the left hip she cannot sleep on that side and occasionally the right.  The left knee does not bother.  She does have bursitis of her hips and reinjected it on the left in 06/11/2021.  Also she has arthritis in both of her knees with medial joint narrowing and already had an arthroscopic surgery on the right knee.  The knee pain is tolerable at this time.  Ambulating without any assistive devices.    No fever no chills no shortness of breath no difficulty with chewing or swallowing or loss of bowel bladder control but her vision or double vision or chest pain or calf pain.    11/12/2021  Bilateral hip pains.  His last visit 08/12/2021 was given injection into the left hip of steroid without much success.  She still having both hips hurting her with the left worse than right unable to sleep on both sides.  She has chronic kidney disease and diabetes but cannot give her any NSAIDs.  She is ambulating without any assistive devices.  As far as the right ankle lateral malleolus fracture is not bothering her at this time and not wearing any braces.  No fever no chills no shortness of breath or difficulty with chewing or swallowing loss of bladder control    01/28/2022  Left shoulder pain  Patient stated no trauma over the last 3- 4 days started with the shoulder pain.  Unable to sleep on it.  Difficulty with raising it above her shoulder level.  I did review her hemoglobin A1c and there was an increase up to 8.8 from 7.3.  Around the holidays she was eating a lot a carbohydrates and sweets.  I did tell her that she needs to control her sugars better.  Last time she was here because she has only 1 kidney I placed her on prednisone low does to help out with the hips and send her to therapy.  Her hips are doing really well the prednisone messed up a little bit her sugar but not so bad.  She could not go to physical therapy due to transportation issues.  She just started doing some exercises a walking on her own.  Pain around  6/10 on the shoulder and 2/10 on the hips in 0/10 on the ankle on the right and in right knee.  No numbness no tingling in her hands or feet      07/21/2022  Left hip severe pain/trochanteric bursitis.  Left shoulder still hurting as well as the right knee and however the right fractured ankle is doing okay.  She has 1 kidney and diabetic cannot have any NSAIDs.  Only show is for her is steroid in the mess up her sugars.  I did go over hemoglobin A1c in the chart and she cannot get it below 8. She stated she stop eating the rice and the potatoes however she expressed that she drinks a lot of cranberry juice and I said mostly old uses have sugar in it.  She needs to drain just water and may be Gatorade without sugar in it.  Her hip is hurting her more than the shoulder as well as now she is having pain in the right knee.  Pain is 7/10.  He    10/20/2022   Left hip trochanteric bursitis pain is 8/10.  Bilateral knee chondromalacia with around 4/10 pain, right ankle fracture with 3/10 pain.  She has 1 kidney cannot take NSAIDs.  Last time we injected the hip it helped for roughly 4 weeks.  We placed her on prednisone and that helped but because being diabetic messed up her sugars.  She only takes Tylenol at this time for pain in keeps active.  We did obtain x-rays of her right ankle today as well as bilateral knees.  She stated the pain in the hip goes down to the outside of the knee on the left.  The right knee is the 1 we scoped a while back.  As far as the right ankle lateral malleolus fracture we treated non operatively and that healed well.    Last time we gave her lesson and about not to eat too much cranberry juice and all the juices have a lot of sugar.  She does have blood work done recently we do not have the results    As far as her shoulder is concerned is tender but not as bad as it used to be   No fever no chills no shortness of breath difficulty with chewing or swallowing loss of bowel bladder control  blurry vision or double vision loss sense smell or taste  Past Medical History:   Diagnosis Date    Anxiety     Arthritis     CKD (chronic kidney disease), stage III     Congenital single kidney     Diabetes mellitus, type 2     GERD (gastroesophageal reflux disease)     Hyperlipidemia     Hypertension     Solitary kidney, acquired 6/3/2022     Past Surgical History:   Procedure Laterality Date    ARTHROSCOPY OF KNEE Right 10/2/2020    Procedure: ARTHROSCOPY, KNEE;  Surgeon: Braxton Tierney MD;  Location: [facility] OR;  Service: Orthopedics;  Laterality: Right;  left greater trochanteric injection    BILATERAL OOPHORECTOMY      EXCISION OF MEDIAL MENISCUS OF KNEE Right 10/2/2020    Procedure: MENISCECTOMY, KNEE, MEDIAL;  Surgeon: Braxton Tierney MD;  Location: [facility] OR;  Service: Orthopedics;  Laterality: Right;  Partial medial meniscectomy    HYSTERECTOMY      INJECTION OF JOINT Left 10/2/2020    Procedure: INJECTION, JOINT, SHOULDER, HIP, OR KNEE;  Surgeon: Braxton Tierney MD;  Location: [facility] OR;  Service: Orthopedics;  Laterality: Left;    SYNOVECTOMY Right 10/2/2020    Procedure: SYNOVECTOMY;  Surgeon: Braxton Tierney MD;  Location: [facility] OR;  Service: Orthopedics;  Laterality: Right;    TUBAL LIGATION       Family History   Problem Relation Age of Onset    Hypertension Mother     Diabetes Mother     Stroke Father      Social History     Socioeconomic History    Marital status: Single   Tobacco Use    Smoking status: Never    Smokeless tobacco: Never   Substance and Sexual Activity    Alcohol use: Never    Drug use: Never    Sexual activity: Not Currently     Partners: Male     Medication List with Changes/Refills   Current Medications    ACYCLOVIR (ZOVIRAX) 800 MG TAB    TAKE 1 TABLET BY MOUTH EVERY DAY    ALBUTEROL (PROVENTIL) 2.5 MG /3 ML (0.083 %) NEBULIZER SOLUTION    Take 3 mLs (2.5 mg total) by nebulization every 4 to 6 hours as needed for Wheezing. Rescue    ALBUTEROL (PROVENTIL/VENTOLIN  "HFA) 90 MCG/ACTUATION INHALER    INHALE 2 PUFFS BY MOUTH EVERY 6 HOURS AS NEEDED FOR WHEEZE    ALPRAZOLAM (XANAX) 1 MG TABLET    TAKE 1 TABLET BY MOUTH DAILY AS NEEDED FOR ANXIETY    AMLODIPINE (NORVASC) 10 MG TABLET    Take 1 tablet (10 mg total) by mouth once daily.    ATORVASTATIN (LIPITOR) 40 MG TABLET    Take 1 tablet (40 mg total) by mouth once daily.    CARVEDILOL (COREG) 25 MG TABLET    Take 1 tablet (25 mg total) by mouth 2 (two) times daily.    CETIRIZINE (ZYRTEC) 10 MG TABLET    Take 1 tablet (10 mg total) by mouth once daily.    CLONIDINE (CATAPRES) 0.2 MG TABLET    Take 1 tablet (0.2 mg total) by mouth 2 (two) times daily.    FLUCONAZOLE (DIFLUCAN) 150 MG TAB    1 po Q week x 2 weeks    FLUTICASONE PROPIONATE (FLONASE) 50 MCG/ACTUATION NASAL SPRAY    2 sprays by Each Nostril route daily as needed.     FUROSEMIDE (LASIX) 40 MG TABLET    TAKE 1 TABLET BY MOUTH EVERY DAY AS NEEDED Strength: 40 mg    GABAPENTIN (NEURONTIN) 300 MG CAPSULE    TAKE 1 CAPSULE BY MOUTH THREE TIMES A DAY    HYDROCHLOROTHIAZIDE (HYDRODIURIL) 25 MG TABLET    Take 1 tablet (25 mg total) by mouth once daily.    INSULIN (LANTUS SOLOSTAR U-100 INSULIN) GLARGINE 100 UNITS/ML SUBQ PEN    Inject 30 Units into the skin once daily.    LOSARTAN (COZAAR) 100 MG TABLET    Take 1 tablet (100 mg total) by mouth once daily.    METFORMIN (GLUCOPHAGE-XR) 500 MG ER 24HR TABLET    Take 1 tablet (500 mg total) by mouth 2 (two) times daily.    MUPIROCIN (BACTROBAN) 2 % OINTMENT    Apply topically 2 (two) times daily.    PANTOPRAZOLE (PROTONIX) 40 MG TABLET    Take 1 tablet (40 mg total) by mouth once daily.    PEN NEEDLE, DIABETIC (BD ULTRA-FINE MINI PEN NEEDLE) 31 GAUGE X 3/16" NDLE    USE DAILY WITH VICTOZA and LANTUS INJECTIONS    POLYETHYLENE GLYCOL (GLYCOLAX) 17 GRAM/DOSE POWDER    Take 17 g by mouth 2 (two) times a day.    PREDNISONE (DELTASONE) 5 MG TABLET    Take 1 tablet (5 mg total) by mouth once daily.    TERAZOSIN (HYTRIN) 2 MG CAPSULE    " Take 1 capsule (2 mg total) by mouth every evening.    TIRZEPATIDE (MOUNJARO) 5 MG/0.5 ML PNIJ    Inject 5 mg into the skin every 7 days.    TRIAMCINOLONE ACETONIDE 0.1% (KENALOG) 0.1 % CREAM    Apply topically every evening.     Review of patient's allergies indicates:  No Known Allergies  Review of Systems   Constitutional: Negative for decreased appetite.   HENT:  Negative for tinnitus.    Eyes:  Negative for double vision.   Cardiovascular:  Negative for chest pain.   Respiratory:  Negative for wheezing.    Hematologic/Lymphatic: Negative for bleeding problem.   Skin:  Negative for dry skin.   Musculoskeletal:  Positive for back pain, joint pain and stiffness. Negative for arthritis, gout and neck pain.   Gastrointestinal:  Negative for abdominal pain.   Genitourinary:  Negative for bladder incontinence.   Neurological:  Negative for numbness, paresthesias and sensory change.   Psychiatric/Behavioral:  Negative for altered mental status.      Objective:   Body mass index is 50.4 kg/m².  There were no vitals filed for this visit.       General    Constitutional: She is oriented to person, place, and time. She appears well-developed.   HENT:   Head: Atraumatic.   Eyes: EOM are normal.   Cardiovascular:  Normal rate.            Pulmonary/Chest: Effort normal.   Neurological: She is alert and oriented to person, place, and time.   Psychiatric: Judgment normal.          Cervical rotation is very functional  Left shoulder some tenderness over the acromioclavicular joint.  Flexion 110° abduction 100° with positive impingement sign.  There is some tenderness over the lateral deltoid and slightly posterior.  Negative drop sign.  Elbow full motion and radial ulnar pulses 2+    Lumbar with slight discomfort around L4-L5 paraspinal but no true deformity seen.  Negative straight leg raising bilaterally  Left hip with pain to palpation over the greater trochanter subsided.  Passive internal external rotation of both hips  without pain in the groin.    Right hip full motion with mild right hip tenderness over the greater trochanter     Hip flexors, abductors, adductors, quads, hamstrings, ankle extensors and flexors, inverters and everters all 5/5  Right knee surgical scars healed well.  She has full extension full flexion.  No joint line tenderness on the lateral aspect mild -moderate tenderness on the medial joint line, no swelling.  It feels like she has a plica with flexion and extension that snaps over the condyle on the lateral side.  Collaterals and cruciate stable  Left knee with very mild discomfort medially.  Mild crepitus.  Full range of motion.  Collaterals or cruciates are stable.  No swelling.  Calves are soft nontender with mild swelling  Multiple varicosities  Right ankle with  very mild if any swelling lateral malleolus..  No pain to palpation over the lateral malleolus around the ankle.  No tenderness over the medial side .  Minimal discomfort to active extension and flexion over the ankle joint.  Gait /ambulating without any assistive devices much better than last time without much of any limping   DP 1+ and PT 1+  Skin is warm to touch no obvious lesions    Relevant imaging results reviewed and interpreted by me, discussed with the patient and / or family today     X-ray 10/20/2022 right ankle healed lateral malleolus without evidence of joint destruction.  There is calcaneal spurs  X-ray 10/20/2022 bilateral knees showing mild to moderate medial joint narrowing and marginal osteophytic changes bilateral knees.  No fracture seen.  X-ray left shoulder 07/21/2022 showing mild degeneration over the acromioclavicular joint.  This calcific deposit over the supraspinatus muscle over the insertion on the humeral head consistent with calcific tendinitis.  Joint space seems to be well maintained/glenohumeral.  No fracture seen  X-ray 8/12/21 of the right ankle showing large amount of callus with mortise view very well  maintained of the lateral malleolus.  Multiple calcaneal spurs posteriorly and inferiorly no change from before  X-ray 07/15/2021 right ankle with large callus formation over the lateral malleolus fracture.  There is no displacement of the mortise few  X-ray 6/28/21 right ankle with callus formation.  1 mm displacement at the mortise view  Outside CD we could not find anything/could not be downloaded  X-ray 06/11/2021 of the pelvis and left hip joint space very well maintained.  No fracture of the hip.  There is osteophyte or what looks like an old avulsion of the inferior ischium on the left side at the origin of the hamstrings with  X-ray 06/11/2021 of the right ankle showing 1 mm displaced lateral malleolus fracture at the joint line.  Multiple osteophytes over the calcaneus inferiorly and on insertion of the Achilles tendon.  Mild degenerative changes in the mid foot  X-ray 06/21/2021 of the right ankle showing 1 mm displaced lateral malleolus fracture of the ankle at the joint line.  Basically no change from 06/11/2021 in displacement.  She has does have some callus formation already seen on the x-ray.  The swelling is markedly improved      12/03/2020 x-ray bilateral knees with medial joint mild to moderate narrowing with osteophytic changes on both knees.  No evidence of fracture.      X-ray in the electronic records of both of her knees 08/17/2020 showing some degenerative changes but joint space is fairly maintained bilaterally.  There is some osteophytic changes and some sclerosis  MRI 08/28/2020 with medial meniscus tear, loose bodies, osteophytic changes and some mild chondral thinning  Assessment:     Encounter Diagnoses   Name Primary?    Greater trochanteric bursitis of left hip Yes    Greater trochanteric bursitis of right hip     Closed low lateral malleolus fracture, right, with routine healing, subsequent encounter     S/P right knee arthroscopy     Arthritis of knee, left     Calcific tendinitis  of left shoulder     Arthritis of knee, right         Plan:   Greater trochanteric bursitis of left hip  -     Large Joint Aspiration/Injection: L greater trochanteric bursa    Greater trochanteric bursitis of right hip    Closed low lateral malleolus fracture, right, with routine healing, subsequent encounter    S/P right knee arthroscopy    Arthritis of knee, left    Calcific tendinitis of left shoulder    Arthritis of knee, right       Patient Instructions    You having severe trochanteric bursitis on the left hip and the injection helped for several weeks  You stated the prednisone I gave you for a month seems to help but might mess up her sugar and you cannot be on it forever   The right knee which we scoped few years back now it is hurting you on the inside   X-ray of both of her knees showing early arthritis  X-ray of your right ankle showing the fracture healed but you have bone spurs in the heel  Because her diabetic we cannot keep on giving you too much cortisone I will inject the left hip today since that what hurts her the most  Will get the insurance to see if they will approve you to have gel injection into the right knee it is made from [employer] /other name [employer] hyaluronic acid injection    Patient has 1 kidney cannot take any anti-inflammatories.  She is diabetic and I went over her hemoglobin A1c and 1 performed this month showing hemoglobin A1c went up to 8.8 compared to 7.2 .  A year ago she was 6.5 much better controlled.  She really admitted that around the holidays she did eat a lot of sugary stuff.  She add a lot of sugar in her coffee and I did tell her she needs to switch her habits maybe put suite in low to minimize the rise in her sugars.  She needs to avoid eating rice and potatoes as much as she can to get her sugars better controlled.  She needs to start losing some weight and she is working on it.  She stated she stop taking the potatoes rice however she  drinks fruit juice and I did tell her that is full of sugars to that is why her hemoglobin A1c is not dropping below 8.     As far as her hips are concerned she could not go to therapy and doing much better as well as her ankle and the right knee.  The left shoulder is the 1 that bothers her and without any history of trauma I did tell her will give her low-dose of steroid injection which might mess up sugar again however she needs to watch what she eats.  There is not much else I can give her for that and she expressed understanding  She cannot take NSAIDs she has 1 kidney and diabetic  Prednisone and steroid are her only treatment options at this time.  07/21/2022 injected the left hip and will hold off on injecting the shoulder and start prednisone 5 mg a week after the injection  Long time spent with the patient going over all of the above  She would need further x-rays next visit      Disclaimer: This note was prepared using a voice recognition system and is likely to have sound alike errors within the text.

## 2022-11-28 ENCOUNTER — TELEPHONE (OUTPATIENT)
Dept: ORTHOPEDICS | Facility: CLINIC | Age: 62
End: 2022-11-28
Payer: MEDICAID

## 2022-11-28 NOTE — TELEPHONE ENCOUNTER
Called pt to inform her that MONOVISC injections were not a covered benefit under her insurance, advised pt that she could still come in and possibly receive a steroid injection. Pt states she does not feel good and does not want to come in if gel injections were not approved. GABE MARCUM.

## 2023-06-06 NOTE — PATIENT INSTRUCTIONS
Hemoglobin A1c 1 year ago was around 6.5 and today couple weeks ago it was up to 8.8  You did eat some carbohydrates and sugar E stuff around the holidays maybe now he should adjust her food and stay away from the rice and the potatoes and the sweets as much as you can  Avoid drinking a lot of soda  Your left shoulder you might have bursitis in it I will give you a low does cortisone injection in it  Ice the shoulder next few days  Next time you come we really need to obtain an x-ray on the shoulder if it still hurting  Continue exercising on your own in doing walks since you could not go to therapy so you do not gain too much weight  Injected subacromial space on the left shoulder with 40 mg Depo-Medrol mixed with 5 cc 1% lidocaine 01/28/2022  I would like you to come to see us within around 8-10 weeks we need x-ray of the left shoulder   Quality 130: Documentation Of Current Medications In The Medical Record: Current Medications Documented Detail Level: Detailed

## 2023-06-14 ENCOUNTER — TELEPHONE (OUTPATIENT)
Dept: ORTHOPEDICS | Facility: CLINIC | Age: 63
End: 2023-06-14
Payer: MEDICAID

## 2023-06-14 NOTE — TELEPHONE ENCOUNTER
----- Message from Felipa Pierce sent at 6/14/2023 10:10 AM CDT -----  Contact: Ramya Bui is needing a call back in regards to scheduling an appt for her left hip and knee pain. Please give her a call back at [phone]

## 2023-06-15 ENCOUNTER — TELEPHONE (OUTPATIENT)
Dept: ORTHOPEDICS | Facility: CLINIC | Age: 63
End: 2023-06-15
Payer: MEDICAID

## 2023-06-15 NOTE — TELEPHONE ENCOUNTER
Called patient to let her know she can come in for ten o'clock on 07/10/23 even though her appointment time is later. There was no answer and no voicemail to leave a message.

## 2023-06-15 NOTE — TELEPHONE ENCOUNTER
----- Message from Rachel Logan sent at 6/15/2023  8:34 AM CDT -----  Contact: jolene  Patient is calling to speak with the nurse in regards to upcoming appt. Reports needing an earlier appt due to transportation. Needing an appt around 10:30am. Please give patient a callback at [phone] or [phone].

## 2023-06-23 DIAGNOSIS — M70.61 GREATER TROCHANTERIC BURSITIS OF RIGHT HIP: Primary | ICD-10-CM

## 2023-06-23 DIAGNOSIS — M70.62 GREATER TROCHANTERIC BURSITIS OF LEFT HIP: ICD-10-CM

## 2023-06-26 ENCOUNTER — TELEPHONE (OUTPATIENT)
Dept: VASCULAR SURGERY | Facility: CLINIC | Age: 63
End: 2023-06-26
Payer: MEDICAID

## 2023-06-26 NOTE — TELEPHONE ENCOUNTER
----- Message from Ela Eduardo sent at 6/26/2023  1:33 PM CDT -----  Regarding: appt  ANDRÉS MCKAY Is referring this pt to see you for Lower leg edema     Can you plz ctc this pt to schedule appt?     The referral is in epic.     Thank you,  Ela eduardo   [facility]

## 2023-06-26 NOTE — TELEPHONE ENCOUNTER
Called patient to schedule appointment with Vascular. Patient is aware of date, time and location.

## 2023-07-10 ENCOUNTER — OFFICE VISIT (OUTPATIENT)
Dept: ORTHOPEDICS | Facility: CLINIC | Age: 63
End: 2023-07-10
Payer: MEDICAID

## 2023-07-10 ENCOUNTER — HOSPITAL ENCOUNTER (OUTPATIENT)
Dept: RADIOLOGY | Facility: HOSPITAL | Age: 63
Discharge: HOME | End: 2023-07-10
Attending: ORTHOPAEDIC SURGERY
Payer: MEDICAID

## 2023-07-10 VITALS — BODY MASS INDEX: 49.11 KG/M2 | HEIGHT: 61 IN | WEIGHT: 260.13 LBS

## 2023-07-10 DIAGNOSIS — M17.12 ARTHRITIS OF KNEE, LEFT: ICD-10-CM

## 2023-07-10 DIAGNOSIS — M70.61 GREATER TROCHANTERIC BURSITIS OF RIGHT HIP: ICD-10-CM

## 2023-07-10 DIAGNOSIS — M70.62 GREATER TROCHANTERIC BURSITIS OF LEFT HIP: ICD-10-CM

## 2023-07-10 DIAGNOSIS — Z98.890 S/P RIGHT KNEE ARTHROSCOPY: ICD-10-CM

## 2023-07-10 DIAGNOSIS — E66.01 MORBID OBESITY WITH BMI OF 45.0-49.9, ADULT: ICD-10-CM

## 2023-07-10 DIAGNOSIS — M75.32 CALCIFIC TENDINITIS OF LEFT SHOULDER: ICD-10-CM

## 2023-07-10 DIAGNOSIS — M17.11 ARTHRITIS OF KNEE, RIGHT: Primary | ICD-10-CM

## 2023-07-10 PROCEDURE — 3061F NEG MICROALBUMINURIA REV: CPT | Mod: CPTII,,, | Performed by: ORTHOPAEDIC SURGERY

## 2023-07-10 PROCEDURE — 1159F MED LIST DOCD IN RCRD: CPT | Mod: CPTII,,, | Performed by: ORTHOPAEDIC SURGERY

## 2023-07-10 PROCEDURE — 99213 PR OFFICE/OUTPT VISIT, EST, LEVL III, 20-29 MIN: ICD-10-PCS | Mod: S$PBB,,, | Performed by: ORTHOPAEDIC SURGERY

## 2023-07-10 PROCEDURE — 73521 X-RAY EXAM HIPS BI 2 VIEWS: CPT | Mod: 26,,, | Performed by: RADIOLOGY

## 2023-07-10 PROCEDURE — 99999 PR PBB SHADOW E&M-EST. PATIENT-LVL IV: CPT | Mod: PBBFAC,,, | Performed by: ORTHOPAEDIC SURGERY

## 2023-07-10 PROCEDURE — 73521 XR HIPS BILATERAL 2 VIEW INCL AP PELVIS: ICD-10-PCS | Mod: 26,,, | Performed by: RADIOLOGY

## 2023-07-10 PROCEDURE — 3066F PR DOCUMENTATION OF TREATMENT FOR NEPHROPATHY: ICD-10-PCS | Mod: CPTII,,, | Performed by: ORTHOPAEDIC SURGERY

## 2023-07-10 PROCEDURE — 1160F PR REVIEW ALL MEDS BY PRESCRIBER/CLIN PHARMACIST DOCUMENTED: ICD-10-PCS | Mod: CPTII,,, | Performed by: ORTHOPAEDIC SURGERY

## 2023-07-10 PROCEDURE — 3008F BODY MASS INDEX DOCD: CPT | Mod: CPTII,,, | Performed by: ORTHOPAEDIC SURGERY

## 2023-07-10 PROCEDURE — 4010F ACE/ARB THERAPY RXD/TAKEN: CPT | Mod: CPTII,,, | Performed by: ORTHOPAEDIC SURGERY

## 2023-07-10 PROCEDURE — 3066F NEPHROPATHY DOC TX: CPT | Mod: CPTII,,, | Performed by: ORTHOPAEDIC SURGERY

## 2023-07-10 PROCEDURE — 99214 OFFICE O/P EST MOD 30 MIN: CPT | Mod: PBBFAC | Performed by: ORTHOPAEDIC SURGERY

## 2023-07-10 PROCEDURE — 3051F PR MOST RECENT HEMOGLOBIN A1C LEVEL 7.0 - < 8.0%: ICD-10-PCS | Mod: CPTII,,, | Performed by: ORTHOPAEDIC SURGERY

## 2023-07-10 PROCEDURE — 3008F PR BODY MASS INDEX (BMI) DOCUMENTED: ICD-10-PCS | Mod: CPTII,,, | Performed by: ORTHOPAEDIC SURGERY

## 2023-07-10 PROCEDURE — 99213 OFFICE O/P EST LOW 20 MIN: CPT | Mod: S$PBB,,, | Performed by: ORTHOPAEDIC SURGERY

## 2023-07-10 PROCEDURE — 1159F PR MEDICATION LIST DOCUMENTED IN MEDICAL RECORD: ICD-10-PCS | Mod: CPTII,,, | Performed by: ORTHOPAEDIC SURGERY

## 2023-07-10 PROCEDURE — 3051F HG A1C>EQUAL 7.0%<8.0%: CPT | Mod: CPTII,,, | Performed by: ORTHOPAEDIC SURGERY

## 2023-07-10 PROCEDURE — 1160F RVW MEDS BY RX/DR IN RCRD: CPT | Mod: CPTII,,, | Performed by: ORTHOPAEDIC SURGERY

## 2023-07-10 PROCEDURE — 3061F PR NEG MICROALBUMINURIA RESULT DOCUMENTED/REVIEW: ICD-10-PCS | Mod: CPTII,,, | Performed by: ORTHOPAEDIC SURGERY

## 2023-07-10 PROCEDURE — 4010F PR ACE/ARB THEARPY RXD/TAKEN: ICD-10-PCS | Mod: CPTII,,, | Performed by: ORTHOPAEDIC SURGERY

## 2023-07-10 PROCEDURE — 99999 PR PBB SHADOW E&M-EST. PATIENT-LVL IV: ICD-10-PCS | Mod: PBBFAC,,, | Performed by: ORTHOPAEDIC SURGERY

## 2023-07-10 PROCEDURE — 73521 X-RAY EXAM HIPS BI 2 VIEWS: CPT | Mod: TC

## 2023-07-10 NOTE — PATIENT INSTRUCTIONS
Your insurance as not been approving the gel injection from the [facility] is [facility] called hyaluronic acid injection  You have arthritis in the right knee however you do not have bone-on-bone yet so the gel injection might help her quite a bit   Your x-rays of her hips you joint spaces still okay except you have bursitis of the left hip   You have severe diabetes you are on [facility] now and I can not give you any cortisone injections  You are taking your gabapentin it helps at least at night give you some rest and less pain at night   We can not give you anti-inflammatory which is arthritis medications like Aleve or Advil because you have stage III kidney disease so it will mess up her kidneys   We can give you steroid injections in the future but we have to be careful because it will mess up her sugars and now we do not want that to happen so they will be a donated gel injection in for your right knee today  (Donated Monovisc for the right knee and that was injected today 07/10/2023)     might take roughly 6 weeks to work.  You may want a ice the needed next few days if it swells up

## 2023-07-10 NOTE — PROGRESS NOTES
Subjective:     Patient ID: Ramya Swain is a 62 y.o. female.    Chief Complaint: Pain of the Left Knee and Pain of the Left Hip     09/25/2020   left hip pain  HPI:  60-year-old been having pain for 2-3 years now.  Cannot remember any specific trauma.  She occasionally does have back pain.  Treatment included physical therapy at [facility].  Cannot take NSAIDs due to having 1 kidney and being diabetic.  Claims cannot sleep on the left side a bothers her.  She does have occasional low back pain.  She does take Tylenol but does not help.  Her workup included x-rays and MRI of her right knee.  Pain is 7/10.  She claims she has catching locking feeling unable to kneel unable to do stairs as well.  She feels occasionally about to fall catches herself because of the right knee.  Pain is episodic.  She is ambulating without any assistive devices.  Denies any fever or chills or shortness of breath or difficulty with chewing or swallowing loss of bowel bladder control or blurry vision double vision or loss of sense of smell or taste    11/09/2020  Date of surgery 10/02/2020 right knee arthroscopy partial medial meniscectomy removal of loose body with finding of chondromalacia also left hip injection.  Patient stated she is not having any hip in pain or any knee pain is very pleased with the results ambulating without any assistive devices.  Pain level 0/10 to both the right knee and left hip.  Very pleased with her results  Denies any fever or chills or shortness of breath or difficulty with chewing or swallowing loss of bowel bladder control or blurry vision double vision loss of sense of smell or taste or numbness or tingling in the legs       12/03/2020  Right knee arthroscopy 10/02/2020 with partial medial meniscectomy and removal of loose body and chondromalacia as well as injection of hip/hip is doing well.  Patient was doing really well until she tripped and fell on the right knee.  Date of injury  11/25/2020.  She wants to make sure nothing is fractured.  Her pain is 4/10 and not so bad and able to function well.  Cannot take any NSAIDs due to chronic kidney disease and being diabetic.  She tries to take only Tylenol.  Denies any fever or chills or shortness of breath or difficulty with chewing or swallowing loss of bowel bladder control blurry vision or double vision or loss of sense smell or taste      06/11/2021  Chief complaint severe right ankle pain, left hip pain.  Patient stated she fell on May 29 in a picnic.  Presents to her primary care they did x-ray.  She showed up with a CD.  She is hurting severe and with ankle swollen.  She is ambulating without any assistive devices.  There was no splinting or anything on the ankle.  She does take gabapentin.  Previously was on tramadol that did not seem to help.  I did scope her knee years ago and that knee is doing okay.  Occasional back pain occasionally and can not sleep on that left hip and sometimes cannot sit on the left hip.  No fever no chills no shortness of breath or difficulty with chewing or swallowing loss of bowel bladder control blurry vision double vision loss sense smell taste    06/21/2021  Right ankle lateral malleolus fracture with 1 mm displacement at the joint line.  Opted to observe this on a weekly basis.  She was placed in clamshell type of a boot and allowed to weight bear as tolerated.  I did tell her if I see displacement on the fracture to on acceptable alignment she will need ORIF with plates and screws.  We gave her Norco last time and that seems to have eased up things her pain is still there at 7/10 but not as bad as it was last visit.  As far as her left hip is concerned that was injected last visit and is markedly improved.  She is walking with the cam walker boot.  She states that she is wearing it 24 hours like I instructed her last time only take it out to wash in put her socks on  Right knee is doing a little bit better  the ankle is well bothers her the most at this time   No fever no chills no shortness of breath difficulty with chewing or swallowing loss of bowel bladder control blurry vision double vision or loss of sense smell or taste    06/28/2021 right ankle lateral malleolus with 1 mm displacement at the mortise.  We treating her non operatively in following her on a weekly basis to make sure it has not moved.  Last week there was callus formation on the x-ray and the pain has slowly getting better.  As far as other problems she has slight bursitis of her hip that is doing much better at this time also she has early degeneration of her knee status post arthroscopic surgery and that is not bother her as much as her ankle at this time.    No fever no chills no shortness of breath no difficulty with chewing or swallowing or loss of bowel bladder control blurry vision double vision loss sense smell or taste    Patient complain today of slight discomfort over the right greater trochanter which is new.  No pain in the groin.  Pain is still 7/10.  Date of injury of the right ankle May 29, 2021      07/15/2021  Right ankle lateral malleolus fracture.  Date of injury 05/29/2021  Pain in the ankle is doing much better at 3/10.  The left hip started to hurt more we did injected on 06/11/2021.  As far as the knees in concerned a little and tender.  She is very pleased that we taking care of her ankle fracture.  Ambulating with a cam walker boot.  Not wearing it at night at this time.  No fever no chills or shortness of breath or difficulty with chewing or swallowing loss of bowel bladder control blurry vision double vision loss sense smell or taste    08/12/21  Right ankle lateral malleolus fracture 05/29/2021.  We opted to treat her non operatively since the mortise was maintained and less than 2 mm of displacement.  She is forming quite a bit of callus formation.  Pain is improving however swelling is there.  Occasional pain at 5/10.   What concerns her the most is the left hip she cannot sleep on that side and occasionally the right.  The left knee does not bother.  She does have bursitis of her hips and reinjected it on the left in 06/11/2021.  Also she has arthritis in both of her knees with medial joint narrowing and already had an arthroscopic surgery on the right knee.  The knee pain is tolerable at this time.  Ambulating without any assistive devices.    No fever no chills no shortness of breath no difficulty with chewing or swallowing or loss of bowel bladder control but her vision or double vision or chest pain or calf pain.    11/12/2021  Bilateral hip pains.  His last visit 08/12/2021 was given injection into the left hip of steroid without much success.  She still having both hips hurting her with the left worse than right unable to sleep on both sides.  She has chronic kidney disease and diabetes but cannot give her any NSAIDs.  She is ambulating without any assistive devices.  As far as the right ankle lateral malleolus fracture is not bothering her at this time and not wearing any braces.  No fever no chills no shortness of breath or difficulty with chewing or swallowing loss of bladder control    01/28/2022  Left shoulder pain  Patient stated no trauma over the last 3- 4 days started with the shoulder pain.  Unable to sleep on it.  Difficulty with raising it above her shoulder level.  I did review her hemoglobin A1c and there was an increase up to 8.8 from 7.3.  Around the holidays she was eating a lot a carbohydrates and sweets.  I did tell her that she needs to control her sugars better.  Last time she was here because she has only 1 kidney I placed her on prednisone low does to help out with the hips and send her to therapy.  Her hips are doing really well the prednisone messed up a little bit her sugar but not so bad.  She could not go to physical therapy due to transportation issues.  She just started doing some exercises a  walking on her own.  Pain around 6/10 on the shoulder and 2/10 on the hips in 0/10 on the ankle on the right and in right knee.  No numbness no tingling in her hands or feet      07/21/2022  Left hip severe pain/trochanteric bursitis.  Left shoulder still hurting as well as the right knee and however the right fractured ankle is doing okay.  She has 1 kidney and diabetic cannot have any NSAIDs.  Only show is for her is steroid in the mess up her sugars.  I did go over hemoglobin A1c in the chart and she cannot get it below 8. She stated she stop eating the rice and the potatoes however she expressed that she drinks a lot of cranberry juice and I said mostly old uses have sugar in it.  She needs to drain just water and may be Gatorade without sugar in it.  Her hip is hurting her more than the shoulder as well as now she is having pain in the right knee.  Pain is 7/10.  He    10/20/2022   Left hip trochanteric bursitis pain is 8/10.  Bilateral knee chondromalacia with around 4/10 pain, right ankle fracture with 3/10 pain.  She has 1 kidney cannot take NSAIDs.  Last time we injected the hip it helped for roughly 4 weeks.  We placed her on prednisone and that helped but because being diabetic messed up her sugars.  She only takes Tylenol at this time for pain in keeps active.  We did obtain x-rays of her right ankle today as well as bilateral knees.  She stated the pain in the hip goes down to the outside of the knee on the left.  The right knee is the 1 we scoped a while back.  As far as the right ankle lateral malleolus fracture we treated non operatively and that healed well.    Last time we gave her lesson and about not to eat too much cranberry juice and all the juices have a lot of sugar.  She does have blood work done recently we do not have the results    As far as her shoulder is concerned is tender but not as bad as it used to be   No fever no chills no shortness of breath difficulty with chewing or swallowing  loss of bowel bladder control blurry vision or double vision loss sense smell or taste      07/10/2023    Severe right knee pain and left hip pain  Your x-rays obtained today and I reviewed all her electronic record.  She has stage III kidney can not get NSAIDs for her arthritic condition on the right knee or bursitis on the left hip.  She has uncontrolled diabetes now she has been taking [place].  She is having hard time with giving herself an injection nobody touch her how to do it.  I did tell her to bring it with her to see her primary care maybe he can teacher or her nurse could teach her.  We tried to avoid steroid because it messes up her sugars all the time and she stated the gabapentin she takes at night helps her to sleep and gives her good night rest.  There is not much we can do we need to avoid the steroid because of the sugar and she can only take Tylenol and we have to avoid the NSAIDs because of stage III kidney.  As well as GERD    Pain is around 9/10   No fever no chills no shortness of breath or difficulty with chewing swallowing loss of bowel bladder control blurry vision double vision loss sense smell taste as the shoulder is concerned is not bothering her as much as when she was here last time.  Just mild right hip and severe left hip trochanteric bursitis in his well as right knee moderate arthritis  Past Medical History:   Diagnosis Date    Anxiety     Arthritis     CKD (chronic kidney disease), stage III     Congenital single kidney     Diabetes mellitus, type 2     GERD (gastroesophageal reflux disease)     Hyperlipidemia     Hypertension     Solitary kidney, acquired 6/3/2022     Past Surgical History:   Procedure Laterality Date    ARTHROSCOPY OF KNEE Right 10/2/2020    Procedure: ARTHROSCOPY, KNEE;  Surgeon: Braxton Tierney MD;  Location: [facility];  Service: Orthopedics;  Laterality: Right;  left greater trochanteric injection    BILATERAL OOPHORECTOMY      EXCISION OF MEDIAL  MENISCUS OF KNEE Right 10/2/2020    Procedure: MENISCECTOMY, KNEE, MEDIAL;  Surgeon: Braxton Tierney MD;  Location: [facility] OR;  Service: Orthopedics;  Laterality: Right;  Partial medial meniscectomy    HYSTERECTOMY      INJECTION OF JOINT Left 10/2/2020    Procedure: INJECTION, JOINT, SHOULDER, HIP, OR KNEE;  Surgeon: Braxton Tierney MD;  Location: [facility] OR;  Service: Orthopedics;  Laterality: Left;    SYNOVECTOMY Right 10/2/2020    Procedure: SYNOVECTOMY;  Surgeon: Braxton Tierney MD;  Location: [facility] OR;  Service: Orthopedics;  Laterality: Right;    TUBAL LIGATION       Family History   Problem Relation Age of Onset    Hypertension Mother     Diabetes Mother     Stroke Father      Social History     Socioeconomic History    Marital status: Single   Tobacco Use    Smoking status: Never    Smokeless tobacco: Never   Substance and Sexual Activity    Alcohol use: Never    Drug use: Never    Sexual activity: Not Currently     Partners: Male     Medication List with Changes/Refills   Current Medications    ACYCLOVIR (ZOVIRAX) 800 MG TAB    TAKE 1 TABLET (800 MG TOTAL) BY MOUTH ONCE DAILY.    ALBUTEROL (PROVENTIL/VENTOLIN HFA) 90 MCG/ACTUATION INHALER    INHALE 2 PUFFS BY MOUTH EVERY 6 HOURS AS NEEDED FOR WHEEZE    ALPRAZOLAM (XANAX) 1 MG TABLET    TAKE 1 TABLET BY MOUTH DAILY AS NEEDED FOR ANXIETY    AMLODIPINE (NORVASC) 10 MG TABLET    Take 1 tablet (10 mg total) by mouth once daily.    ATORVASTATIN (LIPITOR) 40 MG TABLET    Take 1 tablet (40 mg total) by mouth once daily.    CARVEDILOL (COREG) 25 MG TABLET    Take 1 tablet (25 mg total) by mouth 2 (two) times daily.    CETIRIZINE (ZYRTEC) 10 MG TABLET    Take 1 tablet (10 mg total) by mouth once daily.    CLONIDINE (CATAPRES) 0.2 MG TABLET    Take 1 tablet (0.2 mg total) by mouth 2 (two) times daily.    CYCLOBENZAPRINE (FLEXERIL) 10 MG TABLET    TAKE 1 TABLET BY MOUTH THREE TIMES A DAY AS NEEDED FOR MUSCLE SPASMS    FLUCONAZOLE (DIFLUCAN) 150 MG TAB    Take  "one tablet PO now then may repeat dose in 72 hours.    FLUTICASONE PROPIONATE (FLONASE) 50 MCG/ACTUATION NASAL SPRAY    2 sprays by Each Nostril route daily as needed.     FUROSEMIDE (LASIX) 40 MG TABLET    TAKE 1 TABLET BY MOUTH EVERY DAY AS NEEDED    FUROSEMIDE (LASIX) 40 MG TABLET    Take 40 mg by mouth daily as needed.    FUROSEMIDE (LASIX) 40 MG TABLET    Take 1 tablet (40 mg total) by mouth 3 (three) times daily as needed (edema).    GABAPENTIN (NEURONTIN) 300 MG CAPSULE    TAKE 1 CAPSULE BY MOUTH THREE TIMES A DAY    HYDROCHLOROTHIAZIDE (HYDRODIURIL) 25 MG TABLET    TAKE 1 TABLET BY MOUTH EVERY DAY    LANTUS SOLOSTAR U-100 INSULIN GLARGINE 100 UNITS/ML SUBQ PEN    INJECT 30 UNITS INTO THE SKIN ONCE DAILY.    LOSARTAN (COZAAR) 100 MG TABLET    Take 1 tablet (100 mg total) by mouth once daily.    METFORMIN (GLUCOPHAGE-XR) 500 MG ER 24HR TABLET    Take 1 tablet (500 mg total) by mouth 2 (two) times daily.    MOUNJARO 10 MG/0.5 ML PNIJ    SMARTSIG:10 Milligram(s) SUB-Q Once a Week    NYSTATIN (MYCOSTATIN) CREAM    Apply topically 2 (two) times daily.    ONDANSETRON (ZOFRAN-ODT) 4 MG TBDL    Take 1 tablet (4 mg total) by mouth every 8 (eight) hours as needed (nausea).    PANTOPRAZOLE (PROTONIX) 40 MG TABLET    Take 1 tablet (40 mg total) by mouth 2 (two) times daily.    PEN NEEDLE, DIABETIC (BD ULTRA-FINE MINI PEN NEEDLE) 31 GAUGE X 3/16" NDLE    USE DAILY WITH VICTOZA and LANTUS INJECTIONS    POLYETHYLENE GLYCOL (GLYCOLAX) 17 GRAM/DOSE POWDER    Take 17 g by mouth 2 (two) times a day.    PROMETHAZINE (PHENERGAN) 25 MG TABLET    Take 1 tablet (25 mg total) by mouth every 6 (six) hours as needed for Nausea.    TERAZOSIN (HYTRIN) 2 MG CAPSULE    Take 1 capsule (2 mg total) by mouth every evening.     Review of patient's allergies indicates:  No Known Allergies  Review of Systems   Constitutional: Negative for decreased appetite.   HENT:  Negative for tinnitus.    Eyes:  Negative for double vision.   Cardiovascular:  " Negative for chest pain.   Respiratory:  Negative for wheezing.    Hematologic/Lymphatic: Negative for bleeding problem.   Skin:  Negative for dry skin.   Musculoskeletal:  Positive for back pain, joint pain and stiffness. Negative for arthritis, gout and neck pain.   Gastrointestinal:  Negative for abdominal pain.   Genitourinary:  Negative for bladder incontinence.   Neurological:  Negative for numbness, paresthesias and sensory change.   Psychiatric/Behavioral:  Negative for altered mental status.      Objective:   Body mass index is 49.15 kg/m².  There were no vitals filed for this visit.       General    Constitutional: She is oriented to person, place, and time. She appears well-developed.   HENT:   Head: Atraumatic.   Eyes: EOM are normal.   Cardiovascular:  Normal rate.            Pulmonary/Chest: Effort normal.   Neurological: She is alert and oriented to person, place, and time.   Psychiatric: Judgment normal.          Cervical rotation is very functional  Left shoulder some tenderness over the acromioclavicular joint.  Flexion 110° abduction 100° with positive impingement sign.  There is some tenderness over the lateral deltoid and slightly posterior.  Negative drop sign.  Elbow full motion and radial ulnar pulses 2+    Lumbar with slight discomfort around L4-L5 paraspinal but no true deformity seen.  Negative straight leg raising bilaterally  Left hip with pain to palpation over the greater trochanter moderately severe.  Passive internal external rotation of both hips without pain in the groin.    Right hip full motion with mild right hip tenderness over the greater trochanter     Hip flexors, abductors, adductors, quads, hamstrings, ankle extensors and flexors, inverters and everters all 5/5  Right knee surgical scars healed well.  She has full extension full flexion.  No joint line tenderness on the lateral aspect /severe-moderate tenderness on the medial joint line, no swelling.  It feels like she has  a plica with flexion and extension that snaps over the condyle on the lateral side.  Collaterals and cruciate stable  Left knee with very mild discomfort medially.  Mild crepitus.  Full range of motion.  Collaterals or cruciates are stable.  No swelling.  Calves are soft nontender with mild swelling  Multiple varicosities  Right ankle with  very mild if any swelling lateral malleolus..  No pain to palpation over the lateral malleolus around the ankle.  No tenderness over the medial side .  Minimal discomfort to active extension and flexion over the ankle joint.  Gait /ambulating without any assistive devices much better than last time without much of any limping   DP 1+ and PT 1+  Skin is warm to touch no obvious lesions    Relevant imaging results reviewed and interpreted by me, discussed with the patient and / or family today   X-ray pelvis 07/10/2023 bilateral hips joint space seems to be well-maintained PSA some degenerative changes in the lumbar spine on the pelvis film.  Very small osteophyte over the greater trochanter otherwise no fracture seen.  No evidence of osteopenic bone  X-ray 10/20/2022 right ankle healed lateral malleolus without evidence of joint destruction.  There is calcaneal spurs  X-ray 10/20/2022 bilateral knees showing mild to moderate medial joint narrowing and marginal osteophytic changes bilateral knees.  No fracture seen.  X-ray left shoulder 07/21/2022 showing mild degeneration over the acromioclavicular joint.  This calcific deposit over the supraspinatus muscle over the insertion on the humeral head consistent with calcific tendinitis.  Joint space seems to be well maintained/glenohumeral.  No fracture seen  X-ray 8/12/21 of the right ankle showing large amount of callus with mortise view very well maintained of the lateral malleolus.  Multiple calcaneal spurs posteriorly and inferiorly no change from before  X-ray 07/15/2021 right ankle with large callus formation over the lateral  malleolus fracture.  There is no displacement of the mortise few  X-ray 6/28/21 right ankle with callus formation.  1 mm displacement at the mortise view  Outside CD we could not find anything/could not be downloaded  X-ray 06/11/2021 of the pelvis and left hip joint space very well maintained.  No fracture of the hip.  There is osteophyte or what looks like an old avulsion of the inferior ischium on the left side at the origin of the hamstrings with  X-ray 06/11/2021 of the right ankle showing 1 mm displaced lateral malleolus fracture at the joint line.  Multiple osteophytes over the calcaneus inferiorly and on insertion of the Achilles tendon.  Mild degenerative changes in the mid foot  X-ray 06/21/2021 of the right ankle showing 1 mm displaced lateral malleolus fracture of the ankle at the joint line.  Basically no change from 06/11/2021 in displacement.  She has does have some callus formation already seen on the x-ray.  The swelling is markedly improved      12/03/2020 x-ray bilateral knees with medial joint mild to moderate narrowing with osteophytic changes on both knees.  No evidence of fracture.      X-ray in the electronic records of both of her knees 08/17/2020 showing some degenerative changes but joint space is fairly maintained bilaterally.  There is some osteophytic changes and some sclerosis  MRI 08/28/2020 with medial meniscus tear, loose bodies, osteophytic changes and some mild chondral thinning  Assessment:     Encounter Diagnoses   Name Primary?    Greater trochanteric bursitis of right hip     Greater trochanteric bursitis of left hip     S/P right knee arthroscopy     Arthritis of knee, left     Calcific tendinitis of left shoulder     Arthritis of knee, right Yes    Morbid obesity with BMI of 45.0-49.9, adult         Plan:   Arthritis of knee, right    Greater trochanteric bursitis of right hip    Greater trochanteric bursitis of left hip    S/P right knee arthroscopy    Arthritis of knee,  left    Calcific tendinitis of left shoulder    Morbid obesity with BMI of 45.0-49.9, adult         Patient Instructions   Your insurance as not been approving the gel injection from the [facility] is crowns called hyaluronic acid injection  You have arthritis in the right knee however you do not have bone-on-bone yet so the gel injection might help her quite a bit   Your x-rays of her hips you joint spaces still okay except you have bursitis of the left hip   You have severe diabetes you are on [facility] now and I can not give you any cortisone injections  You are taking your gabapentin it helps at least at night give you some rest and less pain at night   We can not give you anti-inflammatory which is arthritis medications like Aleve or Advil because you have stage III kidney disease so it will mess up her kidneys   We can give you steroid injections in the future but we have to be careful because it will mess up her sugars and now we do not want that to happen so they will be a donated gel injection in for your right knee today  (Donated Monovisc for the right knee and that was injected today 07/10/2023)     might take roughly 6 weeks to work.  You may want a ice the needed next few days if it swells up        Disclaimer: This note was prepared using a voice recognition system and is likely to have sound alike errors within the text.

## 2023-07-26 ENCOUNTER — INITIAL CONSULT (OUTPATIENT)
Dept: VASCULAR SURGERY | Facility: CLINIC | Age: 63
End: 2023-07-26
Payer: MEDICAID

## 2023-07-26 VITALS — WEIGHT: 267 LBS | BODY MASS INDEX: 50.44 KG/M2 | SYSTOLIC BLOOD PRESSURE: 138 MMHG | DIASTOLIC BLOOD PRESSURE: 78 MMHG

## 2023-07-26 DIAGNOSIS — R60.0 LOWER LEG EDEMA: Primary | ICD-10-CM

## 2023-07-26 PROCEDURE — 99214 OFFICE O/P EST MOD 30 MIN: CPT | Mod: PBBFAC,PN | Performed by: SURGERY

## 2023-07-26 PROCEDURE — 99999 PR PBB SHADOW E&M-EST. PATIENT-LVL IV: ICD-10-PCS | Mod: PBBFAC,,, | Performed by: SURGERY

## 2023-07-26 PROCEDURE — 99204 PR OFFICE/OUTPT VISIT, NEW, LEVL IV, 45-59 MIN: ICD-10-PCS | Mod: S$PBB,,, | Performed by: SURGERY

## 2023-07-26 PROCEDURE — 99204 OFFICE O/P NEW MOD 45 MIN: CPT | Mod: S$PBB,,, | Performed by: SURGERY

## 2023-07-26 PROCEDURE — 99999 PR PBB SHADOW E&M-EST. PATIENT-LVL IV: CPT | Mod: PBBFAC,,, | Performed by: SURGERY

## 2023-07-26 NOTE — PROGRESS NOTES
"[facility]  Congenital Cardiovascular Surgery  Consult Note    Patient Name: Ramya Swain  MRN: [id number]  Admission Date: (Not on file)  Hospital Length of Stay: 0 days   Attending Physician: No att. providers found  Primary Care Provider: Maggie Bell MD    Patient information was obtained from patient and ER records.     Consults  Subjective:     Chief Complaint lower extremity edema    History of Present Illness:  62-year-old female presents today with bilateral lower extremity edema.    Current Outpatient Medications   Medication    acyclovir (ZOVIRAX) 800 MG Tab    albuterol (PROVENTIL/VENTOLIN HFA) 90 mcg/actuation inhaler    ALPRAZolam (XANAX) 1 MG tablet    amLODIPine (NORVASC) 10 MG tablet    atorvastatin (LIPITOR) 40 MG tablet    carvediloL (COREG) 25 MG tablet    cetirizine (ZYRTEC) 10 MG tablet    cloNIDine (CATAPRES) 0.2 MG tablet    cyclobenzaprine (FLEXERIL) 10 MG tablet    fluconazole (DIFLUCAN) 150 MG Tab    fluticasone propionate (FLONASE) 50 mcg/actuation nasal spray    furosemide (LASIX) 40 MG tablet    furosemide (LASIX) 40 MG tablet    furosemide (LASIX) 40 MG tablet    gabapentin (NEURONTIN) 300 MG capsule    hydroCHLOROthiazide (HYDRODIURIL) 25 MG tablet    LANTUS SOLOSTAR U-100 INSULIN glargine 100 units/mL SubQ pen    losartan (COZAAR) 100 MG tablet    metFORMIN (GLUCOPHAGE-XR) 500 MG ER 24hr tablet    MOUNJARO 10 mg/0.5 mL PnIj    nystatin (MYCOSTATIN) cream    ondansetron (ZOFRAN-ODT) 4 MG TbDL    pantoprazole (PROTONIX) 40 MG tablet    pen needle, diabetic (BD ULTRA-FINE MINI PEN NEEDLE) 31 gauge x 3/16" Ndle    polyethylene glycol (GLYCOLAX) 17 gram/dose powder    promethazine (PHENERGAN) 25 MG tablet    terazosin (HYTRIN) 2 MG capsule     No current facility-administered medications for this visit.       Review of patient's allergies indicates:  No Known Allergies    Past Medical History:   Diagnosis Date    Anxiety     Arthritis     CKD (chronic kidney " disease), stage III     Congenital single kidney     Diabetes mellitus, type 2     GERD (gastroesophageal reflux disease)     Hyperlipidemia     Hypertension     Solitary kidney, acquired 6/3/2022     Past Surgical History:   Procedure Laterality Date    ARTHROSCOPY OF KNEE Right 10/2/2020    Procedure: ARTHROSCOPY, KNEE;  Surgeon: Braxton Tierney MD;  Location: [facility];  Service: Orthopedics;  Laterality: Right;  left greater trochanteric injection    BILATERAL OOPHORECTOMY      EXCISION OF MEDIAL MENISCUS OF KNEE Right 10/2/2020    Procedure: MENISCECTOMY, KNEE, MEDIAL;  Surgeon: Braxton Tierney MD;  Location: [facility] OR;  Service: Orthopedics;  Laterality: Right;  Partial medial meniscectomy    HYSTERECTOMY      INJECTION OF JOINT Left 10/2/2020    Procedure: INJECTION, JOINT, SHOULDER, HIP, OR KNEE;  Surgeon: Braxton Tierney MD;  Location: [facility] OR;  Service: Orthopedics;  Laterality: Left;    SYNOVECTOMY Right 10/2/2020    Procedure: SYNOVECTOMY;  Surgeon: Braxton Tierney MD;  Location: [facility] OR;  Service: Orthopedics;  Laterality: Right;    TUBAL LIGATION       Family History       Problem Relation (Age of Onset)    Diabetes Mother    Hypertension Mother    Stroke Father          Tobacco Use    Smoking status: Never    Smokeless tobacco: Never   Substance and Sexual Activity    Alcohol use: Never    Drug use: Never    Sexual activity: Not Currently     Partners: Male     Review of Systems   Constitutional:  Negative for activity change, appetite change, fatigue and fever.   HENT:  Negative for congestion.    Eyes:  Negative for photophobia, redness and visual disturbance.   Respiratory:  Negative for apnea, cough, chest tightness and shortness of breath.    Cardiovascular:  Negative for chest pain and leg swelling.   Gastrointestinal:  Negative for abdominal pain, nausea and vomiting.   Genitourinary:  Negative for difficulty urinating.   Musculoskeletal:  Negative for gait problem and myalgias.    Skin:  Negative for color change, rash and wound.   Neurological:  Negative for syncope, facial asymmetry, speech difficulty, weakness and numbness.   Objective:     Vital Signs (Most Recent):  BP: 138/78 (07/26/23 1023) Vital Signs (24h Range):  [unfilled]     Weight: 121.1 kg (266 lb 15.6 oz)  Body mass index is 50.44 kg/m².            [unfilled]    Physical Exam  Constitutional:       Appearance: Normal appearance. She is normal weight.   HENT:      Head: Normocephalic and atraumatic.      Mouth/Throat:      Mouth: Mucous membranes are moist.   Eyes:      Extraocular Movements: Extraocular movements intact.      Conjunctiva/sclera: Conjunctivae normal.      Pupils: Pupils are equal, round, and reactive to light.   Neck:      Vascular: No carotid bruit.   Cardiovascular:      Rate and Rhythm: Normal rate and regular rhythm.      Pulses: Normal pulses.   Pulmonary:      Effort: Pulmonary effort is normal.      Breath sounds: Normal breath sounds.   Abdominal:      General: Abdomen is flat. Bowel sounds are normal.      Palpations: Abdomen is soft.   Musculoskeletal:      Cervical back: Neck supple.   Skin:     General: Skin is warm.      Capillary Refill: Capillary refill takes 2 to 3 seconds.   Neurological:      General: No focal deficit present.      Mental Status: She is alert and oriented to person, place, and time. Mental status is at baseline.      Cranial Nerves: No cranial nerve deficit.      Sensory: No sensory deficit.      Motor: No weakness.   Psychiatric:         Mood and Affect: Mood normal.         Behavior: Behavior normal.       Significant Labs:  I have reviewed all pertinent lab results within the past 24 hours.    Significant Diagnostics:  I have reviewed all pertinent imaging results/findings within the past 24 hours.    Assessment/Plan:     There are no hospital problems to display for this patient.      Will obtain chronic venous ultrasound to evaluate for reflux.  Patient instructed to  begin knee-high compression therapy    Thank you for your consult. I will sign off. Please contact us if you have any additional questions.    Shelton Rayo IV, MD  Vascular Surgery  [facility]

## 2024-01-04 ENCOUNTER — TELEPHONE (OUTPATIENT)
Dept: ORTHOPEDICS | Facility: CLINIC | Age: 64
End: 2024-01-04
Payer: MEDICAID

## 2024-01-04 NOTE — TELEPHONE ENCOUNTER
----- Message from Darline Reeder sent at 1/4/2024 12:53 PM CST -----  .Type:  Patient Call Back    Who Called: PT       Does the patient know what this is regarding?: PT CALLED TO SEE IF HER APPOINTMENT CAN BE MOVED TO THE AM AROUND 8:30 DUE TO TRANSPORTATION     Would the patient rather a call back YES     Best Call Back Number:[phone]    Additional Information: Thank You

## 2024-01-04 NOTE — TELEPHONE ENCOUNTER
Returned the patient's phone call in regards to their message. Informed them that they can come early on that day. Patient verbalized understanding.

## 2024-01-09 DIAGNOSIS — M25.561 PAIN IN BOTH KNEES, UNSPECIFIED CHRONICITY: Primary | ICD-10-CM

## 2024-01-09 DIAGNOSIS — M25.562 PAIN IN BOTH KNEES, UNSPECIFIED CHRONICITY: Primary | ICD-10-CM

## 2024-01-11 ENCOUNTER — OFFICE VISIT (OUTPATIENT)
Dept: ORTHOPEDICS | Facility: CLINIC | Age: 64
End: 2024-01-11
Payer: MEDICAID

## 2024-01-11 ENCOUNTER — HOSPITAL ENCOUNTER (OUTPATIENT)
Dept: RADIOLOGY | Facility: HOSPITAL | Age: 64
Discharge: HOME | End: 2024-01-11
Attending: ORTHOPAEDIC SURGERY
Payer: MEDICAID

## 2024-01-11 VITALS — WEIGHT: 256 LBS | HEIGHT: 61 IN | BODY MASS INDEX: 48.33 KG/M2

## 2024-01-11 DIAGNOSIS — M25.562 PAIN IN BOTH KNEES, UNSPECIFIED CHRONICITY: ICD-10-CM

## 2024-01-11 DIAGNOSIS — M75.32 CALCIFIC TENDINITIS OF LEFT SHOULDER: ICD-10-CM

## 2024-01-11 DIAGNOSIS — M76.62 LEFT ACHILLES TENDINITIS: ICD-10-CM

## 2024-01-11 DIAGNOSIS — M17.11 ARTHRITIS OF KNEE, RIGHT: Primary | ICD-10-CM

## 2024-01-11 DIAGNOSIS — M25.561 PAIN IN BOTH KNEES, UNSPECIFIED CHRONICITY: ICD-10-CM

## 2024-01-11 DIAGNOSIS — M70.62 GREATER TROCHANTERIC BURSITIS OF LEFT HIP: ICD-10-CM

## 2024-01-11 DIAGNOSIS — Z98.890 S/P RIGHT KNEE ARTHROSCOPY: ICD-10-CM

## 2024-01-11 DIAGNOSIS — M70.61 GREATER TROCHANTERIC BURSITIS OF RIGHT HIP: ICD-10-CM

## 2024-01-11 PROBLEM — M77.30 CALCANEAL SPUR: Status: ACTIVE | Noted: 2024-01-11

## 2024-01-11 PROCEDURE — 99214 OFFICE O/P EST MOD 30 MIN: CPT | Mod: PBBFAC | Performed by: ORTHOPAEDIC SURGERY

## 2024-01-11 PROCEDURE — 99214 OFFICE O/P EST MOD 30 MIN: CPT | Mod: S$PBB,,, | Performed by: ORTHOPAEDIC SURGERY

## 2024-01-11 PROCEDURE — 3008F BODY MASS INDEX DOCD: CPT | Mod: CPTII,,, | Performed by: ORTHOPAEDIC SURGERY

## 2024-01-11 PROCEDURE — 73564 X-RAY EXAM KNEE 4 OR MORE: CPT | Mod: TC,50

## 2024-01-11 PROCEDURE — 73564 X-RAY EXAM KNEE 4 OR MORE: CPT | Mod: 26,50,, | Performed by: RADIOLOGY

## 2024-01-11 PROCEDURE — 1159F MED LIST DOCD IN RCRD: CPT | Mod: CPTII,,, | Performed by: ORTHOPAEDIC SURGERY

## 2024-01-11 PROCEDURE — 99999 PR PBB SHADOW E&M-EST. PATIENT-LVL IV: CPT | Mod: PBBFAC,,, | Performed by: ORTHOPAEDIC SURGERY

## 2024-01-11 RX ORDER — DICLOFENAC SODIUM 10 MG/G
4 GEL TOPICAL 3 TIMES DAILY
Qty: 1 EACH | Refills: 7 | Status: SHIPPED | OUTPATIENT
Start: 2024-01-11

## 2024-01-11 NOTE — PROGRESS NOTES
Subjective:     Patient ID: Ramya Swain is a 63 y.o. female.    Chief Complaint: Pain of the Right Knee     09/25/2020   left hip pain  HPI:  60-year-old been having pain for 2-3 years now.  Cannot remember any specific trauma.  She occasionally does have back pain.  Treatment included physical therapy at [facility].  Cannot take NSAIDs due to having 1 kidney and being diabetic.  Claims cannot sleep on the left side a bothers her.  She does have occasional low back pain.  She does take Tylenol but does not help.  Her workup included x-rays and MRI of her right knee.  Pain is 7/10.  She claims she has catching locking feeling unable to kneel unable to do stairs as well.  She feels occasionally about to fall catches herself because of the right knee.  Pain is episodic.  She is ambulating without any assistive devices.  Denies any fever or chills or shortness of breath or difficulty with chewing or swallowing loss of bowel bladder control or blurry vision double vision or loss of sense of smell or taste    11/09/2020  Date of surgery 10/02/2020 right knee arthroscopy partial medial meniscectomy removal of loose body with finding of chondromalacia also left hip injection.  Patient stated she is not having any hip in pain or any knee pain is very pleased with the results ambulating without any assistive devices.  Pain level 0/10 to both the right knee and left hip.  Very pleased with her results  Denies any fever or chills or shortness of breath or difficulty with chewing or swallowing loss of bowel bladder control or blurry vision double vision loss of sense of smell or taste or numbness or tingling in the legs       12/03/2020  Right knee arthroscopy 10/02/2020 with partial medial meniscectomy and removal of loose body and chondromalacia as well as injection of hip/hip is doing well.  Patient was doing really well until she tripped and fell on the right knee.  Date of injury 11/25/2020.  She wants to make  sure nothing is fractured.  Her pain is 4/10 and not so bad and able to function well.  Cannot take any NSAIDs due to chronic kidney disease and being diabetic.  She tries to take only Tylenol.  Denies any fever or chills or shortness of breath or difficulty with chewing or swallowing loss of bowel bladder control blurry vision or double vision or loss of sense smell or taste      06/11/2021  Chief complaint severe right ankle pain, left hip pain.  Patient stated she fell on May 29 in a picnic.  Presents to her primary care they did x-ray.  She showed up with a CD.  She is hurting severe and with ankle swollen.  She is ambulating without any assistive devices.  There was no splinting or anything on the ankle.  She does take gabapentin.  Previously was on tramadol that did not seem to help.  I did scope her knee years ago and that knee is doing okay.  Occasional back pain occasionally and can not sleep on that left hip and sometimes cannot sit on the left hip.  No fever no chills no shortness of breath or difficulty with chewing or swallowing loss of bowel bladder control blurry vision double vision loss sense smell taste    06/21/2021  Right ankle lateral malleolus fracture with 1 mm displacement at the joint line.  Opted to observe this on a weekly basis.  She was placed in clamshell type of a boot and allowed to weight bear as tolerated.  I did tell her if I see displacement on the fracture to on acceptable alignment she will need ORIF with plates and screws.  We gave her Norco last time and that seems to have eased up things her pain is still there at 7/10 but not as bad as it was last visit.  As far as her left hip is concerned that was injected last visit and is markedly improved.  She is walking with the cam walker boot.  She states that she is wearing it 24 hours like I instructed her last time only take it out to wash in put her socks on  Right knee is doing a little bit better the ankle is well bothers her  the most at this time   No fever no chills no shortness of breath difficulty with chewing or swallowing loss of bowel bladder control blurry vision double vision or loss of sense smell or taste    06/28/2021 right ankle lateral malleolus with 1 mm displacement at the mortise.  We treating her non operatively in following her on a weekly basis to make sure it has not moved.  Last week there was callus formation on the x-ray and the pain has slowly getting better.  As far as other problems she has slight bursitis of her hip that is doing much better at this time also she has early degeneration of her knee status post arthroscopic surgery and that is not bother her as much as her ankle at this time.    No fever no chills no shortness of breath no difficulty with chewing or swallowing or loss of bowel bladder control blurry vision double vision loss sense smell or taste    Patient complain today of slight discomfort over the right greater trochanter which is new.  No pain in the groin.  Pain is still 7/10.  Date of injury of the right ankle May 29, 2021      07/15/2021  Right ankle lateral malleolus fracture.  Date of injury 05/29/2021  Pain in the ankle is doing much better at 3/10.  The left hip started to hurt more we did injected on 06/11/2021.  As far as the knees in concerned a little and tender.  She is very pleased that we taking care of her ankle fracture.  Ambulating with a cam walker boot.  Not wearing it at night at this time.  No fever no chills or shortness of breath or difficulty with chewing or swallowing loss of bowel bladder control blurry vision double vision loss sense smell or taste    08/12/21  Right ankle lateral malleolus fracture 05/29/2021.  We opted to treat her non operatively since the mortise was maintained and less than 2 mm of displacement.  She is forming quite a bit of callus formation.  Pain is improving however swelling is there.  Occasional pain at 5/10.  What concerns her the most  is the left hip she cannot sleep on that side and occasionally the right.  The left knee does not bother.  She does have bursitis of her hips and reinjected it on the left in 06/11/2021.  Also she has arthritis in both of her knees with medial joint narrowing and already had an arthroscopic surgery on the right knee.  The knee pain is tolerable at this time.  Ambulating without any assistive devices.    No fever no chills no shortness of breath no difficulty with chewing or swallowing or loss of bowel bladder control but her vision or double vision or chest pain or calf pain.    11/12/2021  Bilateral hip pains.  His last visit 08/12/2021 was given injection into the left hip of steroid without much success.  She still having both hips hurting her with the left worse than right unable to sleep on both sides.  She has chronic kidney disease and diabetes but cannot give her any NSAIDs.  She is ambulating without any assistive devices.  As far as the right ankle lateral malleolus fracture is not bothering her at this time and not wearing any braces.  No fever no chills no shortness of breath or difficulty with chewing or swallowing loss of bladder control    01/28/2022  Left shoulder pain  Patient stated no trauma over the last 3- 4 days started with the shoulder pain.  Unable to sleep on it.  Difficulty with raising it above her shoulder level.  I did review her hemoglobin A1c and there was an increase up to 8.8 from 7.3.  Around the holidays she was eating a lot a carbohydrates and sweets.  I did tell her that she needs to control her sugars better.  Last time she was here because she has only 1 kidney I placed her on prednisone low does to help out with the hips and send her to therapy.  Her hips are doing really well the prednisone messed up a little bit her sugar but not so bad.  She could not go to physical therapy due to transportation issues.  She just started doing some exercises a walking on her own.  Pain  around 6/10 on the shoulder and 2/10 on the hips in 0/10 on the ankle on the right and in right knee.  No numbness no tingling in her hands or feet      07/21/2022  Left hip severe pain/trochanteric bursitis.  Left shoulder still hurting as well as the right knee and however the right fractured ankle is doing okay.  She has 1 kidney and diabetic cannot have any NSAIDs.  Only show is for her is steroid in the mess up her sugars.  I did go over hemoglobin A1c in the chart and she cannot get it below 8. She stated she stop eating the rice and the potatoes however she expressed that she drinks a lot of cranberry juice and I said mostly old uses have sugar in it.  She needs to drain just water and may be Gatorade without sugar in it.  Her hip is hurting her more than the shoulder as well as now she is having pain in the right knee.  Pain is 7/10.  He    10/20/2022   Left hip trochanteric bursitis pain is 8/10.  Bilateral knee chondromalacia with around 4/10 pain, right ankle fracture with 3/10 pain.  She has 1 kidney cannot take NSAIDs.  Last time we injected the hip it helped for roughly 4 weeks.  We placed her on prednisone and that helped but because being diabetic messed up her sugars.  She only takes Tylenol at this time for pain in keeps active.  We did obtain x-rays of her right ankle today as well as bilateral knees.  She stated the pain in the hip goes down to the outside of the knee on the left.  The right knee is the 1 we scoped a while back.  As far as the right ankle lateral malleolus fracture we treated non operatively and that healed well.    Last time we gave her lesson and about not to eat too much cranberry juice and all the juices have a lot of sugar.  She does have blood work done recently we do not have the results    As far as her shoulder is concerned is tender but not as bad as it used to be   No fever no chills no shortness of breath difficulty with chewing or swallowing loss of bowel bladder  control blurry vision or double vision loss sense smell or taste      07/10/2023    Severe right knee pain and left hip pain  Your x-rays obtained today and I reviewed all her electronic record.  She has stage III kidney can not get NSAIDs for her arthritic condition on the right knee or bursitis on the left hip.  She has uncontrolled diabetes now she has been taking [place].  She is having hard time with giving herself an injection nobody touch her how to do it.  I did tell her to bring it with her to see her primary care maybe he can teacher or her nurse could teach her.  We tried to avoid steroid because it messes up her sugars all the time and she stated the gabapentin she takes at night helps her to sleep and gives her good night rest.  There is not much we can do we need to avoid the steroid because of the sugar and she can only take Tylenol and we have to avoid the NSAIDs because of stage III kidney.  As well as GERD    Pain is around 9/10   No fever no chills no shortness of breath or difficulty with chewing swallowing loss of bowel bladder control blurry vision double vision loss sense smell taste as the shoulder is concerned is not bothering her as much as when she was here last time.  Just mild right hip and severe left hip trochanteric bursitis in his well as right knee moderate arthritis      01/11/2024   Patient was started on Mounjaro as started to lose weight.  She started a torn and 70 lb she is down to 256 lb.  She gets some GI issues and constipation issues   As far as the right knee is concerned with moderate arthritis she has not hurting as much today with it overall she is hurting more in the left hip and left Achilles.  Pain in the left hip and left Achilles is 7/10  Patient can not take NSAIDs due to stage 3 kidney disease and diabetes.  We checked her hemoglobin A1c from September 2023 was above 8.  She said she had 1 done yesterday and the results are not back and that is since she had  started Mounjaro.  He said the insurance covered it for a year.  Giving her steroid injections at this time might be messing up her sugars and make her gained weight a little bit more and this is the only option we have for numerous issues that she has.  Prednisone could be another option which make her eat more and gained more weight and now that she is having stomach issues we will hold off on any prednisone pills   Only if issues with her she needs to have topical NSAIDs which are the safest option for her at this time   We have Monovisc donated for her right knee and she has not hurting as much with that   Her overall pain is 7/10 in most likely secondary to the left hip not much the right hip as well as the left Achilles.    We went over her x-rays with her today    Right ankle fracture healed  Left shoulder seems to be much better than before  Past Medical History:   Diagnosis Date    Anxiety     Arthritis     CKD (chronic kidney disease), stage III     Congenital single kidney     Diabetes mellitus, type 2     GERD (gastroesophageal reflux disease)     Hyperlipidemia     Hypertension     Solitary kidney, acquired 6/3/2022     Past Surgical History:   Procedure Laterality Date    ARTHROSCOPY OF KNEE Right 10/2/2020    Procedure: ARTHROSCOPY, KNEE;  Surgeon: Braxton Tierney MD;  Location: [facility] OR;  Service: Orthopedics;  Laterality: Right;  left greater trochanteric injection    BILATERAL OOPHORECTOMY      EXCISION OF MEDIAL MENISCUS OF KNEE Right 10/2/2020    Procedure: MENISCECTOMY, KNEE, MEDIAL;  Surgeon: Braxton Tierney MD;  Location: [facility] OR;  Service: Orthopedics;  Laterality: Right;  Partial medial meniscectomy    HYSTERECTOMY      INJECTION OF JOINT Left 10/2/2020    Procedure: INJECTION, JOINT, SHOULDER, HIP, OR KNEE;  Surgeon: Braxton Tierney MD;  Location: [facility] OR;  Service: Orthopedics;  Laterality: Left;    SYNOVECTOMY Right 10/2/2020    Procedure: SYNOVECTOMY;  Surgeon: Braxton MORSE  MD Niall;  Location: [facility] OR;  Service: Orthopedics;  Laterality: Right;    TUBAL LIGATION       Family History   Problem Relation Age of Onset    Hypertension Mother     Diabetes Mother     Stroke Father      Social History     Socioeconomic History    Marital status: Single   Tobacco Use    Smoking status: Never    Smokeless tobacco: Never   Substance and Sexual Activity    Alcohol use: Never    Drug use: Never    Sexual activity: Not Currently     Partners: Male     Medication List with Changes/Refills   New Medications    DICLOFENAC SODIUM (VOLTAREN) 1 % GEL    Apply 4 g topically 3 (three) times daily.   Current Medications    ACYCLOVIR (ZOVIRAX) 800 MG TAB    Take 1 tablet (800 mg total) by mouth once daily.    ALBUTEROL (PROVENTIL/VENTOLIN HFA) 90 MCG/ACTUATION INHALER    INHALE 2 PUFFS BY MOUTH EVERY 6 HOURS AS NEEDED FOR WHEEZE    ALPRAZOLAM (XANAX) 1 MG TABLET    TAKE 1 TABLET BY MOUTH EVERY DAY AS NEEDED FOR ANXIETY    AMLODIPINE (NORVASC) 10 MG TABLET    Take 1 tablet (10 mg total) by mouth once daily.    ATORVASTATIN (LIPITOR) 40 MG TABLET    TAKE 1 TABLET BY MOUTH EVERY DAY    BLOOD SUGAR DIAGNOSTIC STRP    1 each by Misc.(Non-Drug; Combo Route) route 4 (four) times daily.    CARVEDILOL (COREG) 25 MG TABLET    Take 1 tablet (25 mg total) by mouth 2 (two) times daily.    CETIRIZINE (ZYRTEC) 10 MG TABLET    Take 1 tablet (10 mg total) by mouth once daily.    CLONIDINE (CATAPRES) 0.2 MG TABLET    TAKE 1 TABLET BY MOUTH 2 TIMES DAILY.    CYCLOBENZAPRINE (FLEXERIL) 10 MG TABLET    TAKE 1 TABLET BY MOUTH THREE TIMES A DAY AS NEEDED FOR MUSCLE SPASMS    FLUTICASONE PROPIONATE (FLONASE) 50 MCG/ACTUATION NASAL SPRAY    2 sprays by Each Nostril route daily as needed.     FUROSEMIDE (LASIX) 40 MG TABLET    Take 1 tablet (40 mg total) by mouth daily as needed (edema).    GABAPENTIN (NEURONTIN) 300 MG CAPSULE    TAKE 1 CAPSULE BY MOUTH THREE TIMES A DAY    HYDROCHLOROTHIAZIDE (HYDRODIURIL) 25 MG TABLET    Take  "1 tablet (25 mg total) by mouth once daily.    LANCETS (ACCU-CHEK SOFTCLIX LANCETS) MISC    1 each by Misc.(Non-Drug; Combo Route) route 4 (four) times daily.    LANTUS SOLOSTAR U-100 INSULIN GLARGINE 100 UNITS/ML SUBQ PEN    INJECT 30 UNITS INTO THE SKIN ONCE DAILY.    LOSARTAN (COZAAR) 100 MG TABLET    TAKE 1 TABLET BY MOUTH EVERY DAY    METFORMIN (GLUCOPHAGE-XR) 500 MG ER 24HR TABLET    TAKE 1 TABLET BY MOUTH TWICE A DAY    NYSTATIN (MYCOSTATIN) CREAM    Apply topically 2 (two) times daily.    ONDANSETRON (ZOFRAN-ODT) 4 MG TBDL    Take 1 tablet (4 mg total) by mouth every 8 (eight) hours as needed (nausea).    PANTOPRAZOLE (PROTONIX) 40 MG TABLET    TAKE 1 TABLET BY MOUTH TWICE A DAY    PEN NEEDLE, DIABETIC (BD ULTRA-FINE MINI PEN NEEDLE) 31 GAUGE X 3/16" NDLE    USE DAILY WITH VICTOZA and LANTUS INJECTIONS    POLYETHYLENE GLYCOL (GLYCOLAX) 17 GRAM/DOSE POWDER    TAKE 17 GM BY MOUTH 2 (TWO) TIMES A DAY.    PROMETHAZINE (PHENERGAN) 25 MG TABLET    Take 1 tablet (25 mg total) by mouth every 6 (six) hours as needed for Nausea.    TERAZOSIN (HYTRIN) 2 MG CAPSULE    Take 1 capsule (2 mg total) by mouth every evening.    TIRZEPATIDE (MOUNJARO) 12.5 MG/0.5 ML PNIJ    Inject 12.5 mg into the skin every 7 days.     Review of patient's allergies indicates:  No Known Allergies  Review of Systems   Constitutional: Negative for decreased appetite.   HENT:  Negative for tinnitus.    Eyes:  Negative for double vision.   Cardiovascular:  Negative for chest pain.   Respiratory:  Negative for wheezing.    Hematologic/Lymphatic: Negative for bleeding problem.   Skin:  Negative for dry skin.   Musculoskeletal:  Positive for back pain, joint pain and stiffness. Negative for arthritis, gout and neck pain.   Gastrointestinal:  Negative for abdominal pain.   Genitourinary:  Negative for bladder incontinence.   Neurological:  Negative for numbness, paresthesias and sensory change.   Psychiatric/Behavioral:  Negative for altered mental " status.        Objective:   Body mass index is 48.37 kg/m².  There were no vitals filed for this visit.       General    Constitutional: She is oriented to person, place, and time. She appears well-developed.   HENT:   Head: Atraumatic.   Eyes: EOM are normal.   Cardiovascular:  Normal rate.            Pulmonary/Chest: Effort normal.   Neurological: She is alert and oriented to person, place, and time.   Psychiatric: Judgment normal.            Cervical rotation is very functional  Left shoulder some tenderness over the acromioclavicular joint.  Flexion 110° abduction 100° with positive impingement sign.  There is some tenderness over the lateral deltoid and slightly posterior.  Negative drop sign.  Elbow full motion and radial ulnar pulses 2+    Lumbar with slight discomfort around L4-L5 paraspinal but no true deformity seen.  Negative straight leg raising bilaterally  Left hip with pain to palpation over the greater trochanter moderately severe.  Passive internal external rotation of both hips without pain in the groin.    Right hip full motion with mild right hip tenderness over the greater trochanter     Hip flexors, abductors, adductors, quads, hamstrings, ankle extensors and flexors, inverters and everters all 5/5  Right knee surgical scars healed well.  She has full extension full flexion.  No joint line tenderness on the lateral aspect /severe-moderate tenderness on the medial joint line, no swelling.  It feels like she has a plica with flexion and extension that snaps over the condyle on the lateral side.  Collaterals and cruciate stable  Left knee with very mild discomfort medially.  Mild crepitus.  Full range of motion.  Collaterals or cruciates are stable.  No swelling.  Calves are soft nontender with mild swelling  Multiple varicosities  Right ankle with  very mild if any swelling lateral malleolus..  No pain to palpation over the lateral malleolus around the ankle.  No tenderness over the medial side .   Minimal discomfort to active extension and flexion over the ankle joint.  Gait /ambulating without any assistive devices much better than last time without much of any limping   DP 1+ and PT 1+  Skin is warm to touch no obvious lesions    Relevant imaging results reviewed and interpreted by me, discussed with the patient and / or family today     X-ray bilateral knees with right knee mild to moderate medial joint narrowing with small marginal osteophyte, no fracture seen.  The left knee not as much medial joint narrowing which consistent with mild arthritic changes  X-ray pelvis 07/10/2023 bilateral hips joint space seems to be well-maintained PSA some degenerative changes in the lumbar spine on the pelvis film.  Very small osteophyte over the greater trochanter otherwise no fracture seen.  No evidence of osteopenic bone  X-ray 10/20/2022 right ankle healed lateral malleolus without evidence of joint destruction.  There is calcaneal spurs  X-ray 10/20/2022 bilateral knees showing mild to moderate medial joint narrowing and marginal osteophytic changes bilateral knees.  No fracture seen.  X-ray left shoulder 07/21/2022 showing mild degeneration over the acromioclavicular joint.  This calcific deposit over the supraspinatus muscle over the insertion on the humeral head consistent with calcific tendinitis.  Joint space seems to be well maintained/glenohumeral.  No fracture seen  X-ray 8/12/21 of the right ankle showing large amount of callus with mortise view very well maintained of the lateral malleolus.  Multiple calcaneal spurs posteriorly and inferiorly no change from before  X-ray 07/15/2021 right ankle with large callus formation over the lateral malleolus fracture.  There is no displacement of the mortise few  X-ray 6/28/21 right ankle with callus formation.  1 mm displacement at the mortise view  Outside CD we could not find anything/could not be downloaded  X-ray 06/11/2021 of the pelvis and left hip joint  space very well maintained.  No fracture of the hip.  There is osteophyte or what looks like an old avulsion of the inferior ischium on the left side at the origin of the hamstrings with  X-ray 06/11/2021 of the right ankle showing 1 mm displaced lateral malleolus fracture at the joint line.  Multiple osteophytes over the calcaneus inferiorly and on insertion of the Achilles tendon.  Mild degenerative changes in the mid foot  X-ray 06/21/2021 of the right ankle showing 1 mm displaced lateral malleolus fracture of the ankle at the joint line.  Basically no change from 06/11/2021 in displacement.  She has does have some callus formation already seen on the x-ray.  The swelling is markedly improved      12/03/2020 x-ray bilateral knees with medial joint mild to moderate narrowing with osteophytic changes on both knees.  No evidence of fracture.      X-ray in the electronic records of both of her knees 08/17/2020 showing some degenerative changes but joint space is fairly maintained bilaterally.  There is some osteophytic changes and some sclerosis  MRI 08/28/2020 with medial meniscus tear, loose bodies, osteophytic changes and some mild chondral thinning  Assessment:     Encounter Diagnoses   Name Primary?    Arthritis of knee, right Yes    S/P right knee arthroscopy     Greater trochanteric bursitis of right hip     Greater trochanteric bursitis of left hip     Calcific tendinitis of left shoulder     Left Achilles tendinitis         Plan:   Arthritis of knee, right  -     diclofenac sodium (VOLTAREN) 1 % Gel; Apply 4 g topically 3 (three) times daily.  Dispense: 1 each; Refill: 7    S/P right knee arthroscopy    Greater trochanteric bursitis of right hip  -     diclofenac sodium (VOLTAREN) 1 % Gel; Apply 4 g topically 3 (three) times daily.  Dispense: 1 each; Refill: 7    Greater trochanteric bursitis of left hip  -     diclofenac sodium (VOLTAREN) 1 % Gel; Apply 4 g topically 3 (three) times daily.  Dispense: 1  each; Refill: 7    Calcific tendinitis of left shoulder    Left Achilles tendinitis  -     diclofenac sodium (VOLTAREN) 1 % Gel; Apply 4 g topically 3 (three) times daily.  Dispense: 1 each; Refill: 7         Patient Instructions   Your hemoglobin A1c from September was above 8   You are started on Mounjaro on you losing weight we started at 270 lb you down to 256 lb however in might make you constipated and nauseated  You have hip bursitis and you can not take anti-inflammatories because you are stage 3 kidney disease so we have to use topical anti-inflammatories like Voltaren cream  I am not going to give you steroid injection and mess up her sugars at this time  You can apply Voltaren cream apply 2 in to 3 in on the hips 3 times a day and on the ankle 1-2 inches 3 times a day  Continue with the weight loss which is really good   We will hold off on giving you the rooster comb gel/Monovisc to the right knee since it has not hurting you as much we will hold off until it is becoming severely painful (these injections were donated since her insurance will not pay for it)        Disclaimer: This note was prepared using a voice recognition system and is likely to have sound alike errors within the text.

## 2024-01-11 NOTE — PATIENT INSTRUCTIONS
Your hemoglobin A1c from September was above 8   You are started on Mounjaro on you losing weight we started at 270 lb you down to 256 lb however in might make you constipated and nauseated  You have hip bursitis and you can not take anti-inflammatories because you are stage 3 kidney disease so we have to use topical anti-inflammatories like Voltaren cream  I am not going to give you steroid injection and mess up her sugars at this time  You can apply Voltaren cream apply 2 in to 3 in on the hips 3 times a day and on the ankle 1-2 inches 3 times a day  Continue with the weight loss which is really good   We will hold off on giving you the rooster comb gel/Monovisc to the right knee since it has not hurting you as much we will hold off until it is becoming severely painful (these injections were donated since her insurance will not pay for it)

## 2024-03-27 LAB
LEFT EYE DM RETINOPATHY: NEGATIVE
RIGHT EYE DM RETINOPATHY: NEGATIVE

## 2024-04-11 ENCOUNTER — TELEPHONE (OUTPATIENT)
Dept: ORTHOPEDICS | Facility: CLINIC | Age: 64
End: 2024-04-11
Payer: MEDICAID

## 2024-04-11 NOTE — TELEPHONE ENCOUNTER
Returned the patient's phone call in regards to their message. Offered the patient to see Dr. Tierney's PA Shalini Tillman PA-C. Patient states they can not do that day. Informed the patient that I can do Friday 04/19/24 at 10:00 with Shalini Tillman PA-C. Patient verbalized understanding.

## 2024-04-11 NOTE — TELEPHONE ENCOUNTER
----- Message from Marlene Guo sent at 4/11/2024 10:12 AM CDT -----  Contact: Patient  Patient is calling to speak with the nurse regarding her appt scheduled 04/11. Please call patient at [phone].

## 2024-04-15 ENCOUNTER — PATIENT OUTREACH (OUTPATIENT)
Dept: ADMINISTRATIVE | Facility: HOSPITAL | Age: 64
End: 2024-04-15
Payer: MEDICAID

## 2024-04-19 ENCOUNTER — OFFICE VISIT (OUTPATIENT)
Dept: ORTHOPEDICS | Facility: CLINIC | Age: 64
End: 2024-04-19
Payer: MEDICAID

## 2024-04-19 VITALS — WEIGHT: 253.06 LBS | BODY MASS INDEX: 47.78 KG/M2 | HEIGHT: 61 IN

## 2024-04-19 DIAGNOSIS — M25.572 CHRONIC PAIN OF LEFT ANKLE: ICD-10-CM

## 2024-04-19 DIAGNOSIS — M17.11 ARTHRITIS OF KNEE, RIGHT: Primary | ICD-10-CM

## 2024-04-19 DIAGNOSIS — G89.29 CHRONIC PAIN OF LEFT ANKLE: ICD-10-CM

## 2024-04-19 PROCEDURE — 99999 PR PBB SHADOW E&M-EST. PATIENT-LVL II: CPT | Mod: PBBFAC,,, | Performed by: PHYSICIAN ASSISTANT

## 2024-04-19 PROCEDURE — 99212 OFFICE O/P EST SF 10 MIN: CPT | Mod: PBBFAC | Performed by: PHYSICIAN ASSISTANT

## 2024-04-19 PROCEDURE — 99499 UNLISTED E&M SERVICE: CPT | Mod: S$PBB,,, | Performed by: PHYSICIAN ASSISTANT

## 2024-04-19 NOTE — PROGRESS NOTES
Patient presents today for her appointment which was to receive right knee Monovisc that we got donated secondary to patient's insurance coverage  The patient states she kept her appointment as she was having concerns and wanted her left ankle evaluated  I discussed with her that I did not treat the ankle at which point she became aggravated saying that this was a waste a trip to [place] yet again  I discussed with her that I would try to find a provider for her to see today or their 1st availability   Patient refused to make eye contact with me as she continued to keep her face turned away as she spoke    My MA attempted to make an appointment with Podiatry although was unable to secondary to patient's insurance coverage   When patient was updated of this that she would receive a call she became additionally annoyed and irrate   She told my MA not to contact her, not to make any additional calls on her behalf or to her that she is not returning to this facility again.

## 2024-05-07 ENCOUNTER — TELEPHONE (OUTPATIENT)
Dept: ORTHOPEDICS | Facility: CLINIC | Age: 64
End: 2024-05-07
Payer: MEDICAID

## 2024-05-07 NOTE — TELEPHONE ENCOUNTER
Returned the patient's phone call in regards to their message. Apologized to the patient and informed them that none of our orthopedic providers treat the foot/ankle. Patient states that they are schedule with a provider in [place] for their foot/ankle. Offered the patient to reschedule their appointment with Dr. Tierney for their gel injection. Patient states that they will call back once they are ready for their injection. Verbalized understanding.

## 2024-05-07 NOTE — TELEPHONE ENCOUNTER
----- Message from Radha Ferraro sent at 5/7/2024 10:08 AM CDT -----  Contact: [phone]  Patient called in requesting a call back ,  she said she never saw the Dr on her last visit on  4/19  , and was not satisfied with her visit , please call back  [phone]

## 2024-07-11 ENCOUNTER — TELEPHONE (OUTPATIENT)
Dept: ORTHOPEDICS | Facility: CLINIC | Age: 64
End: 2024-07-11
Payer: MEDICAID

## 2024-07-11 NOTE — TELEPHONE ENCOUNTER
----- Message from Agatha Pierce sent at 7/11/2024  8:06 AM CDT -----  Contact: self [phone]  Type:  Needs Medical Advice    Who Called: Ramya   Symptoms (please be specific): knee pain    How long has patient had these symptoms:    Pharmacy name and phone #:    Would the patient rather a call back or a response via [facility]? Call back   Best Call Back Number: [phone]  Additional Information:

## 2024-08-12 ENCOUNTER — TELEPHONE (OUTPATIENT)
Dept: ORTHOPEDICS | Facility: CLINIC | Age: 64
End: 2024-08-12
Payer: MEDICAID

## 2024-08-12 NOTE — TELEPHONE ENCOUNTER
Returned the patient's phone call in regards to their message. Informed the patient that Dr. Tierney's next available date is not until 10/24/24 at 7:40. Patient states that appointment time is to early . Informed the patient that after that the next available date is 11/25/24 at 8:20. Patient asked if we had any 10:00 appointment's. Informed the patient that after that Dr. Tierney's next 10:00 appointment is on 12/23/24. Patient is schedule for 11/25/24 at 8:20. Patient verbalized understanding.

## 2024-08-12 NOTE — TELEPHONE ENCOUNTER
----- Message from eFlipa Pierce sent at 8/12/2024  9:15 AM CDT -----  Contact: Ramya Bui is needing a call back in regards to scheduling an appt for her R knee pain. Please give her a call back at [phone]

## 2024-10-31 ENCOUNTER — OFFICE VISIT (OUTPATIENT)
Dept: ORTHOPEDICS | Facility: CLINIC | Age: 64
End: 2024-10-31
Payer: MEDICAID

## 2024-10-31 VITALS — BODY MASS INDEX: 46.28 KG/M2 | WEIGHT: 245.13 LBS | HEIGHT: 61 IN

## 2024-10-31 DIAGNOSIS — M70.62 GREATER TROCHANTERIC BURSITIS OF LEFT HIP: ICD-10-CM

## 2024-10-31 DIAGNOSIS — Z98.890 S/P RIGHT KNEE ARTHROSCOPY: ICD-10-CM

## 2024-10-31 DIAGNOSIS — M17.11 ARTHRITIS OF KNEE, RIGHT: Primary | ICD-10-CM

## 2024-10-31 DIAGNOSIS — M75.32 CALCIFIC TENDINITIS OF LEFT SHOULDER: ICD-10-CM

## 2024-10-31 DIAGNOSIS — M70.61 GREATER TROCHANTERIC BURSITIS OF RIGHT HIP: ICD-10-CM

## 2024-10-31 DIAGNOSIS — M76.62 LEFT ACHILLES TENDINITIS: ICD-10-CM

## 2024-10-31 PROCEDURE — 3061F NEG MICROALBUMINURIA REV: CPT | Mod: CPTII,,, | Performed by: ORTHOPAEDIC SURGERY

## 2024-10-31 PROCEDURE — 99213 OFFICE O/P EST LOW 20 MIN: CPT | Mod: PBBFAC | Performed by: ORTHOPAEDIC SURGERY

## 2024-10-31 PROCEDURE — 3008F BODY MASS INDEX DOCD: CPT | Mod: CPTII,,, | Performed by: ORTHOPAEDIC SURGERY

## 2024-10-31 PROCEDURE — 99213 OFFICE O/P EST LOW 20 MIN: CPT | Mod: S$PBB,,, | Performed by: ORTHOPAEDIC SURGERY

## 2024-10-31 PROCEDURE — 3051F HG A1C>EQUAL 7.0%<8.0%: CPT | Mod: CPTII,,, | Performed by: ORTHOPAEDIC SURGERY

## 2024-10-31 PROCEDURE — 3066F NEPHROPATHY DOC TX: CPT | Mod: CPTII,,, | Performed by: ORTHOPAEDIC SURGERY

## 2024-10-31 PROCEDURE — 99999 PR PBB SHADOW E&M-EST. PATIENT-LVL III: CPT | Mod: PBBFAC,,, | Performed by: ORTHOPAEDIC SURGERY

## 2024-10-31 PROCEDURE — 1159F MED LIST DOCD IN RCRD: CPT | Mod: CPTII,,, | Performed by: ORTHOPAEDIC SURGERY

## 2024-10-31 PROCEDURE — 4010F ACE/ARB THERAPY RXD/TAKEN: CPT | Mod: CPTII,,, | Performed by: ORTHOPAEDIC SURGERY

## 2025-04-29 ENCOUNTER — TELEPHONE (OUTPATIENT)
Dept: ORTHOPEDICS | Facility: CLINIC | Age: 65
End: 2025-04-29
Payer: MEDICAID

## 2025-04-29 NOTE — TELEPHONE ENCOUNTER
----- Message from Nurse Vital sent at 4/29/2025 11:38 AM CDT -----    ----- Message -----  From: Zena Nowak  Sent: 4/29/2025  11:22 AM CDT  To: Niall Limon Staff    Who Called: PtWhat is the request in detail: Requesting call back to discuss rescheduling appt. Please adviseCan the clinic reply by [facility]? Erin Call Back Number: [phone] Information:

## 2025-06-24 DIAGNOSIS — M25.562 PAIN IN BOTH KNEES, UNSPECIFIED CHRONICITY: Primary | ICD-10-CM

## 2025-06-24 DIAGNOSIS — M25.561 PAIN IN BOTH KNEES, UNSPECIFIED CHRONICITY: Primary | ICD-10-CM

## 2025-07-03 ENCOUNTER — OFFICE VISIT (OUTPATIENT)
Dept: ORTHOPEDICS | Facility: CLINIC | Age: 65
End: 2025-07-03
Payer: MEDICAID

## 2025-07-03 ENCOUNTER — HOSPITAL ENCOUNTER (OUTPATIENT)
Dept: RADIOLOGY | Facility: HOSPITAL | Age: 65
Discharge: HOME | End: 2025-07-03
Attending: ORTHOPAEDIC SURGERY
Payer: MEDICAID

## 2025-07-03 VITALS — BODY MASS INDEX: 46.08 KG/M2 | WEIGHT: 244.06 LBS | HEIGHT: 61 IN

## 2025-07-03 DIAGNOSIS — M70.62 GREATER TROCHANTERIC BURSITIS OF LEFT HIP: ICD-10-CM

## 2025-07-03 DIAGNOSIS — M70.61 GREATER TROCHANTERIC BURSITIS OF RIGHT HIP: ICD-10-CM

## 2025-07-03 DIAGNOSIS — M79.645 PAIN OF LEFT THUMB: Primary | ICD-10-CM

## 2025-07-03 DIAGNOSIS — M25.562 PAIN IN BOTH KNEES, UNSPECIFIED CHRONICITY: ICD-10-CM

## 2025-07-03 DIAGNOSIS — M17.12 ARTHRITIS OF KNEE, LEFT: ICD-10-CM

## 2025-07-03 DIAGNOSIS — M76.62 LEFT ACHILLES TENDINITIS: ICD-10-CM

## 2025-07-03 DIAGNOSIS — M17.11 ARTHRITIS OF KNEE, RIGHT: ICD-10-CM

## 2025-07-03 DIAGNOSIS — Z98.890 S/P RIGHT KNEE ARTHROSCOPY: ICD-10-CM

## 2025-07-03 DIAGNOSIS — M75.32 CALCIFIC TENDINITIS OF LEFT SHOULDER: ICD-10-CM

## 2025-07-03 DIAGNOSIS — M25.561 PAIN IN BOTH KNEES, UNSPECIFIED CHRONICITY: ICD-10-CM

## 2025-07-03 PROCEDURE — 73564 X-RAY EXAM KNEE 4 OR MORE: CPT | Mod: TC,50

## 2025-07-03 PROCEDURE — 20600 DRAIN/INJ JOINT/BURSA W/O US: CPT | Mod: PBBFAC,LT | Performed by: ORTHOPAEDIC SURGERY

## 2025-07-03 PROCEDURE — 99999 PR PBB SHADOW E&M-EST. PATIENT-LVL IV: CPT | Mod: PBBFAC,,, | Performed by: ORTHOPAEDIC SURGERY

## 2025-07-03 PROCEDURE — 99214 OFFICE O/P EST MOD 30 MIN: CPT | Mod: PBBFAC,25 | Performed by: ORTHOPAEDIC SURGERY

## 2025-07-03 PROCEDURE — 99999PBSHW PR PBB SHADOW TECHNICAL ONLY FILED TO HB: Mod: PBBFAC,,,

## 2025-07-03 PROCEDURE — 73564 X-RAY EXAM KNEE 4 OR MORE: CPT | Mod: 26,50,, | Performed by: RADIOLOGY

## 2025-07-03 RX ADMIN — TRIAMCINOLONE ACETONIDE 40 MG: 200 INJECTION, SUSPENSION INTRA-ARTICULAR; INTRAMUSCULAR at 10:07

## 2025-07-03 NOTE — PATIENT INSTRUCTIONS
You been having severe pain in the left thumb   The pain is localized at the base of the thumb occasionally in the metacarpal phalangeal joint but mostly at the CMC joint  Injection of 2 cc of 1% lidocaine mixed with 40 mg of Kenalog performed at the CMC joint   We gave you a CMC joint brace/thumb spica to wear as needed   Your right knee insurance had not approved her having Monovisc injection  We will see if the manufacture/pharmaceutical company will donate 1 since last time the Monovisc seems to have helped her knee well   As far as the ankle she said she is not bothered with it as much   She does take Tylenol for pain on as needed basis   You are having some pain in the left hip and was injected last time on 10/20/2022 with Depo-Medrol but now with the you being diabetic we will mess up her sugars too much   Return within 6 weeks

## 2025-07-03 NOTE — PROCEDURES
Small Joint Aspiration/Injection: L thumb MCP    Date/Time: 7/3/2025 10:40 AM    Performed by: Braxton Tierney MD  Authorized by: Braxton Tierney MD    Consent Done?:  Yes (Verbal)  Indications:  Pain  Timeout: prior to procedure the correct patient, procedure, and site was verified    Local anesthesia used?: Yes    Local anesthetic:  Lidocaine 1% without epinephrine  Location:  Thumb  Site:  L thumb MCP  Needle size:  22 G  Approach:  Radial  Medications:  40 mg triamcinolone acetonide 40 mg/mL

## 2025-07-03 NOTE — PROGRESS NOTES
Subjective:     Patient ID: Ramya Swain is a 64 y.o. female.    Chief Complaint: Pain of the Right Knee and Pain of the Left Hip     09/25/2020   left hip pain  HPI:  60-year-old been having pain for 2-3 years now.  Cannot remember any specific trauma.  She occasionally does have back pain.  Treatment included physical therapy at [facility].  Cannot take NSAIDs due to having 1 kidney and being diabetic.  Claims cannot sleep on the left side a bothers her.  She does have occasional low back pain.  She does take Tylenol but does not help.  Her workup included x-rays and MRI of her right knee.  Pain is 7/10.  She claims she has catching locking feeling unable to kneel unable to do stairs as well.  She feels occasionally about to fall catches herself because of the right knee.  Pain is episodic.  She is ambulating without any assistive devices.  Denies any fever or chills or shortness of breath or difficulty with chewing or swallowing loss of bowel bladder control or blurry vision double vision or loss of sense of smell or taste    11/09/2020  Date of surgery 10/02/2020 right knee arthroscopy partial medial meniscectomy removal of loose body with finding of chondromalacia also left hip injection.  Patient stated she is not having any hip in pain or any knee pain is very pleased with the results ambulating without any assistive devices.  Pain level 0/10 to both the right knee and left hip.  Very pleased with her results  Denies any fever or chills or shortness of breath or difficulty with chewing or swallowing loss of bowel bladder control or blurry vision double vision loss of sense of smell or taste or numbness or tingling in the legs       12/03/2020  Right knee arthroscopy 10/02/2020 with partial medial meniscectomy and removal of loose body and chondromalacia as well as injection of hip/hip is doing well.  Patient was doing really well until she tripped and fell on the right knee.  Date of injury  11/25/2020.  She wants to make sure nothing is fractured.  Her pain is 4/10 and not so bad and able to function well.  Cannot take any NSAIDs due to chronic kidney disease and being diabetic.  She tries to take only Tylenol.  Denies any fever or chills or shortness of breath or difficulty with chewing or swallowing loss of bowel bladder control blurry vision or double vision or loss of sense smell or taste      06/11/2021  Chief complaint severe right ankle pain, left hip pain.  Patient stated she fell on May 29 in a picnic.  Presents to her primary care they did x-ray.  She showed up with a CD.  She is hurting severe and with ankle swollen.  She is ambulating without any assistive devices.  There was no splinting or anything on the ankle.  She does take gabapentin.  Previously was on tramadol that did not seem to help.  I did scope her knee years ago and that knee is doing okay.  Occasional back pain occasionally and can not sleep on that left hip and sometimes cannot sit on the left hip.  No fever no chills no shortness of breath or difficulty with chewing or swallowing loss of bowel bladder control blurry vision double vision loss sense smell taste    06/21/2021  Right ankle lateral malleolus fracture with 1 mm displacement at the joint line.  Opted to observe this on a weekly basis.  She was placed in clamshell type of a boot and allowed to weight bear as tolerated.  I did tell her if I see displacement on the fracture to on acceptable alignment she will need ORIF with plates and screws.  We gave her Norco last time and that seems to have eased up things her pain is still there at 7/10 but not as bad as it was last visit.  As far as her left hip is concerned that was injected last visit and is markedly improved.  She is walking with the cam walker boot.  She states that she is wearing it 24 hours like I instructed her last time only take it out to wash in put her socks on  Right knee is doing a little bit better  the ankle is well bothers her the most at this time   No fever no chills no shortness of breath difficulty with chewing or swallowing loss of bowel bladder control blurry vision double vision or loss of sense smell or taste    06/28/2021 right ankle lateral malleolus with 1 mm displacement at the mortise.  We treating her non operatively in following her on a weekly basis to make sure it has not moved.  Last week there was callus formation on the x-ray and the pain has slowly getting better.  As far as other problems she has slight bursitis of her hip that is doing much better at this time also she has early degeneration of her knee status post arthroscopic surgery and that is not bother her as much as her ankle at this time.    No fever no chills no shortness of breath no difficulty with chewing or swallowing or loss of bowel bladder control blurry vision double vision loss sense smell or taste    Patient complain today of slight discomfort over the right greater trochanter which is new.  No pain in the groin.  Pain is still 7/10.  Date of injury of the right ankle May 29, 2021      07/15/2021  Right ankle lateral malleolus fracture.  Date of injury 05/29/2021  Pain in the ankle is doing much better at 3/10.  The left hip started to hurt more we did injected on 06/11/2021.  As far as the knees in concerned a little and tender.  She is very pleased that we taking care of her ankle fracture.  Ambulating with a cam walker boot.  Not wearing it at night at this time.  No fever no chills or shortness of breath or difficulty with chewing or swallowing loss of bowel bladder control blurry vision double vision loss sense smell or taste    08/12/21  Right ankle lateral malleolus fracture 05/29/2021.  We opted to treat her non operatively since the mortise was maintained and less than 2 mm of displacement.  She is forming quite a bit of callus formation.  Pain is improving however swelling is there.  Occasional pain at 5/10.   What concerns her the most is the left hip she cannot sleep on that side and occasionally the right.  The left knee does not bother.  She does have bursitis of her hips and reinjected it on the left in 06/11/2021.  Also she has arthritis in both of her knees with medial joint narrowing and already had an arthroscopic surgery on the right knee.  The knee pain is tolerable at this time.  Ambulating without any assistive devices.    No fever no chills no shortness of breath no difficulty with chewing or swallowing or loss of bowel bladder control but her vision or double vision or chest pain or calf pain.    11/12/2021  Bilateral hip pains.  His last visit 08/12/2021 was given injection into the left hip of steroid without much success.  She still having both hips hurting her with the left worse than right unable to sleep on both sides.  She has chronic kidney disease and diabetes but cannot give her any NSAIDs.  She is ambulating without any assistive devices.  As far as the right ankle lateral malleolus fracture is not bothering her at this time and not wearing any braces.  No fever no chills no shortness of breath or difficulty with chewing or swallowing loss of bladder control    01/28/2022  Left shoulder pain  Patient stated no trauma over the last 3- 4 days started with the shoulder pain.  Unable to sleep on it.  Difficulty with raising it above her shoulder level.  I did review her hemoglobin A1c and there was an increase up to 8.8 from 7.3.  Around the holidays she was eating a lot a carbohydrates and sweets.  I did tell her that she needs to control her sugars better.  Last time she was here because she has only 1 kidney I placed her on prednisone low does to help out with the hips and send her to therapy.  Her hips are doing really well the prednisone messed up a little bit her sugar but not so bad.  She could not go to physical therapy due to transportation issues.  She just started doing some exercises a  walking on her own.  Pain around 6/10 on the shoulder and 2/10 on the hips in 0/10 on the ankle on the right and in right knee.  No numbness no tingling in her hands or feet      07/21/2022  Left hip severe pain/trochanteric bursitis.  Left shoulder still hurting as well as the right knee and however the right fractured ankle is doing okay.  She has 1 kidney and diabetic cannot have any NSAIDs.  Only show is for her is steroid in the mess up her sugars.  I did go over hemoglobin A1c in the chart and she cannot get it below 8. She stated she stop eating the rice and the potatoes however she expressed that she drinks a lot of cranberry juice and I said mostly old uses have sugar in it.  She needs to drain just water and may be Gatorade without sugar in it.  Her hip is hurting her more than the shoulder as well as now she is having pain in the right knee.  Pain is 7/10.  He    10/20/2022   Left hip trochanteric bursitis pain is 8/10.  Bilateral knee chondromalacia with around 4/10 pain, right ankle fracture with 3/10 pain.  She has 1 kidney cannot take NSAIDs.  Last time we injected the hip it helped for roughly 4 weeks.  We placed her on prednisone and that helped but because being diabetic messed up her sugars.  She only takes Tylenol at this time for pain in keeps active.  We did obtain x-rays of her right ankle today as well as bilateral knees.  She stated the pain in the hip goes down to the outside of the knee on the left.  The right knee is the 1 we scoped a while back.  As far as the right ankle lateral malleolus fracture we treated non operatively and that healed well.    Last time we gave her lesson and about not to eat too much cranberry juice and all the juices have a lot of sugar.  She does have blood work done recently we do not have the results    As far as her shoulder is concerned is tender but not as bad as it used to be   No fever no chills no shortness of breath difficulty with chewing or swallowing  loss of bowel bladder control blurry vision or double vision loss sense smell or taste      07/10/2023    Severe right knee pain and left hip pain  Your x-rays obtained today and I reviewed all her electronic record.  She has stage III kidney can not get NSAIDs for her arthritic condition on the right knee or bursitis on the left hip.  She has uncontrolled diabetes now she has been taking [place].  She is having hard time with giving herself an injection nobody touch her how to do it.  I did tell her to bring it with her to see her primary care maybe he can teacher or her nurse could teach her.  We tried to avoid steroid because it messes up her sugars all the time and she stated the gabapentin she takes at night helps her to sleep and gives her good night rest.  There is not much we can do we need to avoid the steroid because of the sugar and she can only take Tylenol and we have to avoid the NSAIDs because of stage III kidney.  As well as GERD    Pain is around 9/10   No fever no chills no shortness of breath or difficulty with chewing swallowing loss of bowel bladder control blurry vision double vision loss sense smell taste as the shoulder is concerned is not bothering her as much as when she was here last time.  Just mild right hip and severe left hip trochanteric bursitis in his well as right knee moderate arthritis      01/11/2024   Patient was started on Mounjaro as started to lose weight.  She started a torn and 70 lb she is down to 256 lb.  She gets some GI issues and constipation issues   As far as the right knee is concerned with moderate arthritis she has not hurting as much today with it overall she is hurting more in the left hip and left Achilles.  Pain in the left hip and left Achilles is 7/10  Patient can not take NSAIDs due to stage 3 kidney disease and diabetes.  We checked her hemoglobin A1c from September 2023 was above 8.  She said she had 1 done yesterday and the results are not back and  that is since she had started Mounjaro.  He said the insurance covered it for a year.  Giving her steroid injections at this time might be messing up her sugars and make her gained weight a little bit more and this is the only option we have for numerous issues that she has.  Prednisone could be another option which make her eat more and gained more weight and now that she is having stomach issues we will hold off on any prednisone pills   Only if issues with her she needs to have topical NSAIDs which are the safest option for her at this time   We have Monovisc donated for her right knee and she has not hurting as much with that   Her overall pain is 7/10 in most likely secondary to the left hip not much the right hip as well as the left Achilles.    We went over her x-rays with her today    Right ankle fracture healed  Left shoulder seems to be much better than before    10/31/2024   Patient was denied viscosupplementation by her insurance.  Managed to get Monovisc donated for her.  She sees pain management she is still having pain in the left ankle as well in her back.  Went over her medications with her today she can not take any more NSAIDs due to stage 3 kidney disease.  She states that she is controlling her sugars really well at this point.  We are avoiding steroid because she gains weight and messes up her sugars.  Overall pain right now is 8/10   She is still taking her gabapentin  No fever no chills no shortness of breath no difficulty with chewing swallowing loss bowel bladder control  Her right ankle is healed not bothering her as much as her left ankle    06/30/2025   Left shoulder is not bothering her as well as the left ankle also the right ankle is doing better she had ORIF of the fracture of the ankle years ago.  Her hips are bothering her and she has bursitis but not as severe as her thumb.  Her pain is 8/10 overall.  She did receive left shoulder Depo-Medrol 01/28/2022, left hip Depo-Medrol  10/20/2022, right knee Monovisc which was donated 10/31/2024 which her insurance did not cover.  We tried to repeat the viscosupplementation in her insurance denied her again despite the fact she stated that it helped.  She did undergo right knee scope 10/02/2020.  Left thumb is the 1 that bothers her the most today and I did tell her to big the 1 that hurts the most but between the left hip left thumb and right knee.  She picked the thumb that is seems to be the bone that hurts her the most.  She is diabetic we do not want to give her too much steroid in mess up her sugars.  She had not lost any weight.  Her BMI 46.11 at 110.7 kg.  She points at the pain at the base of the left thumb at the CMC joint.  No fever no chills no shortness breath or difficulty with chewing swallowing loss of bowel  Past Medical History:   Diagnosis Date    Anxiety     Arthritis     CKD (chronic kidney disease), stage III     Congenital single kidney     Diabetes mellitus, type 2     GERD (gastroesophageal reflux disease)     Hyperlipidemia     Hypertension     Solitary kidney, acquired 6/3/2022     Past Surgical History:   Procedure Laterality Date    ARTHROSCOPY OF KNEE Right 10/2/2020    Procedure: ARTHROSCOPY, KNEE;  Surgeon: Braxton Tierney MD;  Location: [facility] OR;  Service: Orthopedics;  Laterality: Right;  left greater trochanteric injection    BILATERAL OOPHORECTOMY      EXCISION OF MEDIAL MENISCUS OF KNEE Right 10/2/2020    Procedure: MENISCECTOMY, KNEE, MEDIAL;  Surgeon: Braxton Tierney MD;  Location: [facility] OR;  Service: Orthopedics;  Laterality: Right;  Partial medial meniscectomy    HYSTERECTOMY      INJECTION OF JOINT Left 10/2/2020    Procedure: INJECTION, JOINT, SHOULDER, HIP, OR KNEE;  Surgeon: Braxton Tierney MD;  Location: [facility] OR;  Service: Orthopedics;  Laterality: Left;    SYNOVECTOMY Right 10/2/2020    Procedure: SYNOVECTOMY;  Surgeon: Braxton Tierney MD;  Location: [facility] OR;  Service: Orthopedics;   Laterality: Right;    TUBAL LIGATION       Family History   Problem Relation Name Age of Onset    Hypertension Mother      Diabetes Mother      Stroke Father       Social History     Socioeconomic History    Marital status: Single   Tobacco Use    Smoking status: Never    Smokeless tobacco: Never   Substance and Sexual Activity    Alcohol use: Never    Drug use: Never    Sexual activity: Not Currently     Partners: Male     Medication List with Changes/Refills   Current Medications    ACYCLOVIR (ZOVIRAX) 800 MG TAB    TAKE 1 TABLET (800 MG TOTAL) BY MOUTH ONCE DAILY.    ALBUTEROL (PROVENTIL/VENTOLIN HFA) 90 MCG/ACTUATION INHALER    TAKE 2 PUFFS BY MOUTH EVERY 6 HOURS AS NEEDED FOR WHEEZE    ALPRAZOLAM (XANAX) 1 MG TABLET    TAKE 1 TABLET BY MOUTH EVERY DAY AS NEEDED FOR ANXIETY    AMLODIPINE (NORVASC) 10 MG TABLET    Take 1 tablet (10 mg total) by mouth once daily.    ASPIRIN (ECOTRIN) 81 MG EC TABLET    Take 81 mg by mouth.    ATORVASTATIN (LIPITOR) 40 MG TABLET    Take 1 tablet (40 mg total) by mouth once daily.    BLOOD SUGAR DIAGNOSTIC STRP    1 each by Misc.(Non-Drug; Combo Route) route 4 (four) times daily.    BLOOD-GLUCOSE METER,CONTINUOUS MISC    1 each by Misc.(Non-Drug; Combo Route) route 4 (four) times daily. Type 2 diabetic, on insulin. Lantus.    CARVEDILOL (COREG) 25 MG TABLET    Take 1 tablet (25 mg total) by mouth 2 (two) times daily.    CETIRIZINE (ZYRTEC) 10 MG TABLET    TAKE 1 TABLET BY MOUTH EVERY DAY    CLONIDINE (CATAPRES) 0.2 MG TABLET    TAKE 1 TABLET BY MOUTH TWICE A DAY    COLCHICINE (COLCRYS) 0.6 MG TABLET    Take 2 tablets (1.2 mg total) by mouth once daily for 1 day, THEN 1 tablet (0.6 mg total) 2 (two) times daily for 7 days.    CYCLOBENZAPRINE (FLEXERIL) 10 MG TABLET    TAKE 1 TABLET BY MOUTH TWICE A DAY AS NEEDED FOR MUSCLE SPASMS    FAMOTIDINE (PEPCID) 40 MG TABLET    TAKE 1 TABLET BY MOUTH EVERY DAY    FLASH GLUCOSE SENSOR KIT    1 each by Misc.(Non-Drug; Combo Route) route 4 (four)  "times daily. Type 2 diabetic, on insulin. Lantus.    FUROSEMIDE (LASIX) 40 MG TABLET    TAKE 1 TABLET (40 MG TOTAL) BY MOUTH DAILY AS NEEDED (EDEMA).    GABAPENTIN (NEURONTIN) 300 MG CAPSULE    Take 1 capsule (300 mg total) by mouth 3 (three) times daily.    HYDROCHLOROTHIAZIDE (HYDRODIURIL) 25 MG TABLET    Take 1 tablet (25 mg total) by mouth once daily.    HYDROCODONE-ACETAMINOPHEN (NORCO)  MG PER TABLET    Take 1 tablet by mouth every 6 (six) hours as needed.    ISOSORBIDE MONONITRATE (IMDUR) 30 MG 24 HR TABLET    Take 30 mg by mouth.    LANCETS (ACCU-CHEK SOFTCLIX LANCETS) MISC    1 each by Misc.(Non-Drug; Combo Route) route 4 (four) times daily.    LANTUS SOLOSTAR U-100 INSULIN 100 UNIT/ML (3 ML) INPN PEN    INJECT 30 UNITS INTO THE SKIN ONCE DAILY.    LINZESS 145 MCG CAP CAPSULE    TAKE 1 CAPSULE BY MOUTH BEFORE BREAKFAST    LOSARTAN (COZAAR) 100 MG TABLET    Take 1 tablet (100 mg total) by mouth once daily.    METFORMIN (GLUCOPHAGE-XR) 500 MG ER 24HR TABLET    Take 1 tablet (500 mg total) by mouth 2 (two) times daily.    ONDANSETRON (ZOFRAN-ODT) 8 MG TBDL    Take 1 tablet (8 mg total) by mouth every 8 (eight) hours.    PANTOPRAZOLE (PROTONIX) 40 MG TABLET    Take 1 tablet (40 mg total) by mouth once daily.    PEN NEEDLE, DIABETIC (BD ULTRA-FINE MINI PEN NEEDLE) 31 GAUGE X 3/16" NDLE    USE DAILY WITH VICTOZA and LANTUS INJECTIONS    POLYETHYLENE GLYCOL (GLYCOLAX) 17 GRAM/DOSE POWDER    TAKE 17 GM BY MOUTH 2 (TWO) TIMES A DAY.    PROMETHAZINE (PHENERGAN) 25 MG TABLET    Take 1 tablet (25 mg total) by mouth every 6 (six) hours as needed for Nausea.    TERAZOSIN (HYTRIN) 2 MG CAPSULE    Take 1 capsule (2 mg total) by mouth every evening.    TIRZEPATIDE (MOUNJARO) 10 MG/0.5 ML PNIJ    Inject 10 mg into the skin every 7 days.     Review of patient's allergies indicates:  No Known Allergies  Review of Systems   Constitutional: Negative for decreased appetite.   HENT:  Negative for tinnitus.    Eyes:  Negative " for double vision.   Cardiovascular:  Negative for chest pain.   Respiratory:  Negative for wheezing.    Hematologic/Lymphatic: Negative for bleeding problem.   Skin:  Negative for dry skin.   Musculoskeletal:  Positive for back pain, joint pain and stiffness. Negative for arthritis, gout and neck pain.   Gastrointestinal:  Negative for abdominal pain.   Genitourinary:  Negative for bladder incontinence.   Neurological:  Negative for numbness, paresthesias and sensory change.   Psychiatric/Behavioral:  Negative for altered mental status.        Objective:   Body mass index is 46.11 kg/m².  There were no vitals filed for this visit.       General    Constitutional: She is oriented to person, place, and time. She appears well-developed.   HENT:   Head: Atraumatic.   Eyes: EOM are normal.   Cardiovascular:  Normal rate.            Pulmonary/Chest: Effort normal.   Neurological: She is alert and oriented to person, place, and time.   Psychiatric: Judgment normal.           Left upper extremity radial pulses 2+, sensory intact to touch to the tip of the thumb as well all digits.  Negative Tinel sign at the wrist over the carpal tunnel.  She has pain which is severe at the base of the thumb at the CMC joint.  Any maneuvering has a little bit of crepitus to motion.      Lumbar with slight discomfort around L4-L5 paraspinal but no true deformity seen.  Negative straight leg raising bilaterally  Left hip with pain to palpation over the greater trochanter moderately severe.  Passive internal external rotation of both hips without pain in the groin.    Right hip full motion with mild right hip tenderness over the greater trochanter     Hip flexors, abductors, adductors, quads, hamstrings, ankle extensors and flexors, inverters and everters all 5/5  Right knee surgical scars healed well.  She has full extension full flexion.  No joint line tenderness on the lateral aspect /severe-moderate tenderness on the medial joint line, no  swelling.  It feels like she has a plica with flexion and extension that snaps over the condyle on the lateral side.  Collaterals and cruciate stable  Left knee with very mild discomfort medially.  Mild crepitus.  Full range of motion.  Collaterals or cruciates are stable.  No swelling.  Calves are soft nontender with mild swelling  Multiple varicosities  Right ankle with  very mild if any swelling lateral malleolus..  No pain to palpation over the lateral malleolus around the ankle.  No tenderness over the medial side .  Minimal discomfort to active extension and flexion over the ankle joint.  Gait /ambulating without any assistive devices much better than last time without much of any limping   DP 1+ and PT 1+  Skin is warm to touch no obvious lesions    Relevant imaging results reviewed and interpreted by me, discussed with the patient and / or family today     X-ray bilateral knees with right knee mild to moderate medial joint narrowing with small marginal osteophyte, no fracture seen.  The left knee not as much medial joint narrowing which consistent with mild arthritic changes  X-ray pelvis 07/10/2023 bilateral hips joint space seems to be well-maintained PSA some degenerative changes in the lumbar spine on the pelvis film.  Very small osteophyte over the greater trochanter otherwise no fracture seen.  No evidence of osteopenic bone  X-ray 10/20/2022 right ankle healed lateral malleolus without evidence of joint destruction.  There is calcaneal spurs  X-ray 10/20/2022 bilateral knees showing mild to moderate medial joint narrowing and marginal osteophytic changes bilateral knees.  No fracture seen.  X-ray left shoulder 07/21/2022 showing mild degeneration over the acromioclavicular joint.  This calcific deposit over the supraspinatus muscle over the insertion on the humeral head consistent with calcific tendinitis.  Joint space seems to be well maintained/glenohumeral.  No fracture seen  X-ray 8/12/21 of the  right ankle showing large amount of callus with mortise view very well maintained of the lateral malleolus.  Multiple calcaneal spurs posteriorly and inferiorly no change from before  X-ray 07/15/2021 right ankle with large callus formation over the lateral malleolus fracture.  There is no displacement of the mortise few  X-ray 6/28/21 right ankle with callus formation.  1 mm displacement at the mortise view  Outside CD we could not find anything/could not be downloaded  X-ray 06/11/2021 of the pelvis and left hip joint space very well maintained.  No fracture of the hip.  There is osteophyte or what looks like an old avulsion of the inferior ischium on the left side at the origin of the hamstrings with  X-ray 06/11/2021 of the right ankle showing 1 mm displaced lateral malleolus fracture at the joint line.  Multiple osteophytes over the calcaneus inferiorly and on insertion of the Achilles tendon.  Mild degenerative changes in the mid foot  X-ray 06/21/2021 of the right ankle showing 1 mm displaced lateral malleolus fracture of the ankle at the joint line.  Basically no change from 06/11/2021 in displacement.  She has does have some callus formation already seen on the x-ray.  The swelling is markedly improved      12/03/2020 x-ray bilateral knees with medial joint mild to moderate narrowing with osteophytic changes on both knees.  No evidence of fracture.      X-ray in the electronic records of both of her knees 08/17/2020 showing some degenerative changes but joint space is fairly maintained bilaterally.  There is some osteophytic changes and some sclerosis  MRI 08/28/2020 with medial meniscus tear, loose bodies, osteophytic changes and some mild chondral thinning  Assessment:     Encounter Diagnoses   Name Primary?    Arthritis of knee, right     S/P right knee arthroscopy     Greater trochanteric bursitis of right hip     Greater trochanteric bursitis of left hip     Calcific tendinitis of left shoulder      Left Achilles tendinitis     Arthritis of knee, left     Pain of left thumb Yes        Plan:   Pain of left thumb  -     Small Joint Aspiration/Injection: L thumb MCP    Arthritis of knee, right    S/P right knee arthroscopy    Greater trochanteric bursitis of right hip    Greater trochanteric bursitis of left hip    Calcific tendinitis of left shoulder    Left Achilles tendinitis    Arthritis of knee, left         Patient Instructions   You been having severe pain in the left thumb   The pain is localized at the base of the thumb occasionally in the metacarpal phalangeal joint but mostly at the CMC joint  Injection of 2 cc of 1% lidocaine mixed with 40 mg of Kenalog performed at the CMC joint   We gave you a CMC joint brace/thumb spica to wear as needed   Your right knee insurance had not approved her having Monovisc injection  We will see if the manufacture/pharmaceutical company will donate 1 since last time the Monovisc seems to have helped her knee well   As far as the ankle she said she is not bothered with it as much   She does take Tylenol for pain on as needed basis   You are having some pain in the left hip and was injected last time on 10/20/2022 with Depo-Medrol but now with the you being diabetic we will mess up her sugars too much   Return within 6 weeks        Disclaimer: This note was prepared using a voice recognition system and is likely to have sound alike errors within the text.

## 2025-07-04 RX ORDER — TRIAMCINOLONE ACETONIDE 40 MG/ML
40 INJECTION, SUSPENSION INTRA-ARTICULAR; INTRAMUSCULAR
Status: DISCONTINUED | OUTPATIENT
Start: 2025-07-03 | End: 2025-07-04 | Stop reason: HOSPADM

## (undated) DEVICE — PAD CAST SPECIALIST STRL 6

## (undated) DEVICE — GLOVE SURG PLYSPHRN ORTH SZ7.5

## (undated) DEVICE — TUBING ARTHRO IRR 4-LEAD

## (undated) DEVICE — ELECTRODE LOOP 20MMX12MM

## (undated) DEVICE — GLOVE 8 PROTEXIS PI BLUE

## (undated) DEVICE — SEE MEDLINE ITEM 152529

## (undated) DEVICE — SOL IRR NACL .9% 3000ML

## (undated) DEVICE — DRAPE EMERALD 87X114.75X113

## (undated) DEVICE — TOURNIQUET SB QC SP 34X4IN

## (undated) DEVICE — BANDAGE ACE ELASTIC 6"

## (undated) DEVICE — MANIFOLD 4 PORT

## (undated) DEVICE — SEE MEDLINE ITEM 152739

## (undated) DEVICE — SEE MEDLINE ITEM 157117

## (undated) DEVICE — DRESSING XEROFORM FOIL PK 1X8

## (undated) DEVICE — GAUZE SPONGE 4X4 12PLY

## (undated) DEVICE — SYR 30CC LUER LOCK

## (undated) DEVICE — APPLICATOR CHLORAPREP ORN 26ML

## (undated) DEVICE — SEE MEDLINE ITEM 146231

## (undated) DEVICE — GOWN SMARTGOWN LVL4 X-LONG XL

## (undated) DEVICE — SEE MEDLINE ITEM 146292

## (undated) DEVICE — SEE MEDLINE ITEM 157027

## (undated) DEVICE — NDL SPINAL 18GX3.5 SPINOCAN

## (undated) DEVICE — DRAPE PLASTIC U 60X72

## (undated) DEVICE — SUT ETHILON 3-0 PS2 18 BLK

## (undated) DEVICE — SHAVER TOMCAT 4.0

## (undated) DEVICE — ALCOHOL 70% ISOP RUBBING 4OZ

## (undated) DEVICE — SEE MEDLINE ITEM 157216

## (undated) DEVICE — SEE MEDLINE ITEM 152622

## (undated) DEVICE — SEE MEDLINE ITEM 152523